# Patient Record
Sex: FEMALE | Race: WHITE | Employment: UNEMPLOYED | URBAN - METROPOLITAN AREA
[De-identification: names, ages, dates, MRNs, and addresses within clinical notes are randomized per-mention and may not be internally consistent; named-entity substitution may affect disease eponyms.]

---

## 2022-06-23 ENCOUNTER — ANESTHESIA EVENT (OUTPATIENT)
Dept: OPERATING ROOM | Age: 72
DRG: 481 | End: 2022-06-23
Payer: MEDICARE

## 2022-06-23 ENCOUNTER — ANESTHESIA (OUTPATIENT)
Dept: OPERATING ROOM | Age: 72
DRG: 481 | End: 2022-06-23
Payer: MEDICARE

## 2022-06-23 ENCOUNTER — APPOINTMENT (OUTPATIENT)
Dept: GENERAL RADIOLOGY | Age: 72
DRG: 481 | End: 2022-06-23
Payer: MEDICARE

## 2022-06-23 ENCOUNTER — HOSPITAL ENCOUNTER (INPATIENT)
Age: 72
LOS: 2 days | Discharge: HOME | DRG: 481 | End: 2022-06-25
Attending: EMERGENCY MEDICINE | Admitting: ORTHOPAEDIC SURGERY
Payer: MEDICARE

## 2022-06-23 DIAGNOSIS — S72.002A CLOSED LEFT HIP FRACTURE, INITIAL ENCOUNTER (HCC): Primary | ICD-10-CM

## 2022-06-23 LAB
ABSOLUTE EOS #: 0 K/UL (ref 0–0.4)
ABSOLUTE LYMPH #: 0.5 K/UL (ref 1–4.8)
ABSOLUTE MONO #: 0.3 K/UL (ref 0.1–1.3)
ALBUMIN SERPL-MCNC: 4.3 G/DL (ref 3.5–5.2)
ALP BLD-CCNC: 95 U/L (ref 35–104)
ALT SERPL-CCNC: 10 U/L (ref 5–33)
ANION GAP SERPL CALCULATED.3IONS-SCNC: 11 MMOL/L (ref 9–17)
AST SERPL-CCNC: 16 U/L
BASOPHILS # BLD: 1 % (ref 0–2)
BASOPHILS ABSOLUTE: 0 K/UL (ref 0–0.2)
BILIRUB SERPL-MCNC: 0.8 MG/DL (ref 0.3–1.2)
BUN BLDV-MCNC: 13 MG/DL (ref 8–23)
CALCIUM SERPL-MCNC: 8.9 MG/DL (ref 8.6–10.4)
CHLORIDE BLD-SCNC: 103 MMOL/L (ref 98–107)
CO2: 26 MMOL/L (ref 20–31)
CREAT SERPL-MCNC: 0.46 MG/DL (ref 0.5–0.9)
EOSINOPHILS RELATIVE PERCENT: 1 % (ref 0–4)
GFR AFRICAN AMERICAN: >60 ML/MIN
GFR NON-AFRICAN AMERICAN: >60 ML/MIN
GFR SERPL CREATININE-BSD FRML MDRD: ABNORMAL ML/MIN/{1.73_M2}
GLUCOSE BLD-MCNC: 105 MG/DL (ref 70–99)
HCT VFR BLD CALC: 42 % (ref 36–46)
HEMOGLOBIN: 13.7 G/DL (ref 12–16)
INR BLD: 1
LYMPHOCYTES # BLD: 7 % (ref 24–44)
MCH RBC QN AUTO: 30.2 PG (ref 26–34)
MCHC RBC AUTO-ENTMCNC: 32.7 G/DL (ref 31–37)
MCV RBC AUTO: 92.4 FL (ref 80–100)
MONOCYTES # BLD: 4 % (ref 1–7)
PDW BLD-RTO: 15.9 % (ref 11.5–14.9)
PLATELET # BLD: 150 K/UL (ref 150–450)
PMV BLD AUTO: 9 FL (ref 6–12)
POTASSIUM SERPL-SCNC: 3.6 MMOL/L (ref 3.7–5.3)
PROTHROMBIN TIME: 12.8 SEC (ref 11.8–14.6)
RBC # BLD: 4.54 M/UL (ref 4–5.2)
SEG NEUTROPHILS: 87 % (ref 36–66)
SEGMENTED NEUTROPHILS ABSOLUTE COUNT: 5.9 K/UL (ref 1.3–9.1)
SODIUM BLD-SCNC: 140 MMOL/L (ref 135–144)
TOTAL PROTEIN: 6.8 G/DL (ref 6.4–8.3)
WBC # BLD: 6.8 K/UL (ref 3.5–11)

## 2022-06-23 PROCEDURE — 6360000002 HC RX W HCPCS: Performed by: EMERGENCY MEDICINE

## 2022-06-23 PROCEDURE — 0QS706Z REPOSITION LEFT UPPER FEMUR WITH INTRAMEDULLARY INTERNAL FIXATION DEVICE, OPEN APPROACH: ICD-10-PCS | Performed by: ORTHOPAEDIC SURGERY

## 2022-06-23 PROCEDURE — 3700000000 HC ANESTHESIA ATTENDED CARE: Performed by: ORTHOPAEDIC SURGERY

## 2022-06-23 PROCEDURE — 2500000003 HC RX 250 WO HCPCS: Performed by: ANESTHESIOLOGY

## 2022-06-23 PROCEDURE — C1769 GUIDE WIRE: HCPCS | Performed by: ORTHOPAEDIC SURGERY

## 2022-06-23 PROCEDURE — 6360000002 HC RX W HCPCS: Performed by: ANESTHESIOLOGY

## 2022-06-23 PROCEDURE — 6360000002 HC RX W HCPCS: Performed by: ORTHOPAEDIC SURGERY

## 2022-06-23 PROCEDURE — 36415 COLL VENOUS BLD VENIPUNCTURE: CPT

## 2022-06-23 PROCEDURE — 2580000003 HC RX 258: Performed by: ANESTHESIOLOGY

## 2022-06-23 PROCEDURE — 80053 COMPREHEN METABOLIC PANEL: CPT

## 2022-06-23 PROCEDURE — 3600000004 HC SURGERY LEVEL 4 BASE: Performed by: ORTHOPAEDIC SURGERY

## 2022-06-23 PROCEDURE — 3700000001 HC ADD 15 MINUTES (ANESTHESIA): Performed by: ORTHOPAEDIC SURGERY

## 2022-06-23 PROCEDURE — 3600000014 HC SURGERY LEVEL 4 ADDTL 15MIN: Performed by: ORTHOPAEDIC SURGERY

## 2022-06-23 PROCEDURE — 6360000002 HC RX W HCPCS: Performed by: INTERNAL MEDICINE

## 2022-06-23 PROCEDURE — 7100000000 HC PACU RECOVERY - FIRST 15 MIN: Performed by: ORTHOPAEDIC SURGERY

## 2022-06-23 PROCEDURE — 3209999900 FLUORO FOR SURGICAL PROCEDURES

## 2022-06-23 PROCEDURE — 7100000001 HC PACU RECOVERY - ADDTL 15 MIN: Performed by: ORTHOPAEDIC SURGERY

## 2022-06-23 PROCEDURE — 99285 EMERGENCY DEPT VISIT HI MDM: CPT

## 2022-06-23 PROCEDURE — 2720000010 HC SURG SUPPLY STERILE: Performed by: ORTHOPAEDIC SURGERY

## 2022-06-23 PROCEDURE — 1200000000 HC SEMI PRIVATE

## 2022-06-23 PROCEDURE — 96374 THER/PROPH/DIAG INJ IV PUSH: CPT

## 2022-06-23 PROCEDURE — 2580000003 HC RX 258: Performed by: ORTHOPAEDIC SURGERY

## 2022-06-23 PROCEDURE — 93005 ELECTROCARDIOGRAM TRACING: CPT | Performed by: EMERGENCY MEDICINE

## 2022-06-23 PROCEDURE — 85025 COMPLETE CBC W/AUTO DIFF WBC: CPT

## 2022-06-23 PROCEDURE — 85610 PROTHROMBIN TIME: CPT

## 2022-06-23 PROCEDURE — 99222 1ST HOSP IP/OBS MODERATE 55: CPT | Performed by: INTERNAL MEDICINE

## 2022-06-23 PROCEDURE — 73502 X-RAY EXAM HIP UNI 2-3 VIEWS: CPT

## 2022-06-23 PROCEDURE — C1713 ANCHOR/SCREW BN/BN,TIS/BN: HCPCS | Performed by: ORTHOPAEDIC SURGERY

## 2022-06-23 PROCEDURE — 2709999900 HC NON-CHARGEABLE SUPPLY: Performed by: ORTHOPAEDIC SURGERY

## 2022-06-23 DEVICE — IMPLANTABLE DEVICE: Type: IMPLANTABLE DEVICE | Site: HIP | Status: FUNCTIONAL

## 2022-06-23 DEVICE — SCREW BNE L38MM DIA5MM NONSTERILE TIB LT GRN TI ST CANN LOK: Type: IMPLANTABLE DEVICE | Site: HIP | Status: FUNCTIONAL

## 2022-06-23 RX ORDER — ACETAMINOPHEN 325 MG/1
650 TABLET ORAL EVERY 4 HOURS PRN
Status: DISCONTINUED | OUTPATIENT
Start: 2022-06-23 | End: 2022-06-25 | Stop reason: HOSPADM

## 2022-06-23 RX ORDER — SODIUM CHLORIDE 0.9 % (FLUSH) 0.9 %
5-40 SYRINGE (ML) INJECTION EVERY 12 HOURS SCHEDULED
Status: DISCONTINUED | OUTPATIENT
Start: 2022-06-23 | End: 2022-06-23 | Stop reason: HOSPADM

## 2022-06-23 RX ORDER — ONDANSETRON 2 MG/ML
4 INJECTION INTRAMUSCULAR; INTRAVENOUS
Status: DISCONTINUED | OUTPATIENT
Start: 2022-06-23 | End: 2022-06-23 | Stop reason: HOSPADM

## 2022-06-23 RX ORDER — SODIUM CHLORIDE, SODIUM LACTATE, POTASSIUM CHLORIDE, CALCIUM CHLORIDE 600; 310; 30; 20 MG/100ML; MG/100ML; MG/100ML; MG/100ML
INJECTION, SOLUTION INTRAVENOUS CONTINUOUS PRN
Status: DISCONTINUED | OUTPATIENT
Start: 2022-06-23 | End: 2022-06-23 | Stop reason: SDUPTHER

## 2022-06-23 RX ORDER — ENOXAPARIN SODIUM 100 MG/ML
30 INJECTION SUBCUTANEOUS DAILY
Status: DISCONTINUED | OUTPATIENT
Start: 2022-06-24 | End: 2022-06-25 | Stop reason: HOSPADM

## 2022-06-23 RX ORDER — HYDROCODONE BITARTRATE AND ACETAMINOPHEN 5; 325 MG/1; MG/1
2 TABLET ORAL EVERY 4 HOURS PRN
Status: DISCONTINUED | OUTPATIENT
Start: 2022-06-23 | End: 2022-06-25 | Stop reason: HOSPADM

## 2022-06-23 RX ORDER — POTASSIUM CHLORIDE 20 MEQ/1
40 TABLET, EXTENDED RELEASE ORAL PRN
Status: DISCONTINUED | OUTPATIENT
Start: 2022-06-23 | End: 2022-06-25 | Stop reason: HOSPADM

## 2022-06-23 RX ORDER — SODIUM CHLORIDE 9 MG/ML
INJECTION, SOLUTION INTRAVENOUS PRN
Status: DISCONTINUED | OUTPATIENT
Start: 2022-06-23 | End: 2022-06-25 | Stop reason: HOSPADM

## 2022-06-23 RX ORDER — ONDANSETRON 4 MG/1
4 TABLET, ORALLY DISINTEGRATING ORAL EVERY 8 HOURS PRN
Status: DISCONTINUED | OUTPATIENT
Start: 2022-06-23 | End: 2022-06-25 | Stop reason: HOSPADM

## 2022-06-23 RX ORDER — SODIUM CHLORIDE 0.9 % (FLUSH) 0.9 %
5-40 SYRINGE (ML) INJECTION PRN
Status: DISCONTINUED | OUTPATIENT
Start: 2022-06-23 | End: 2022-06-25 | Stop reason: HOSPADM

## 2022-06-23 RX ORDER — HYDRALAZINE HYDROCHLORIDE 20 MG/ML
10 INJECTION INTRAMUSCULAR; INTRAVENOUS
Status: DISCONTINUED | OUTPATIENT
Start: 2022-06-23 | End: 2022-06-23 | Stop reason: HOSPADM

## 2022-06-23 RX ORDER — FENTANYL CITRATE 50 UG/ML
INJECTION, SOLUTION INTRAMUSCULAR; INTRAVENOUS
Status: DISPENSED
Start: 2022-06-23 | End: 2022-06-24

## 2022-06-23 RX ORDER — METOCLOPRAMIDE HYDROCHLORIDE 5 MG/ML
10 INJECTION INTRAMUSCULAR; INTRAVENOUS
Status: DISCONTINUED | OUTPATIENT
Start: 2022-06-23 | End: 2022-06-23 | Stop reason: HOSPADM

## 2022-06-23 RX ORDER — SODIUM CHLORIDE 0.9 % (FLUSH) 0.9 %
5-40 SYRINGE (ML) INJECTION EVERY 12 HOURS SCHEDULED
Status: DISCONTINUED | OUTPATIENT
Start: 2022-06-23 | End: 2022-06-25 | Stop reason: HOSPADM

## 2022-06-23 RX ORDER — FENTANYL CITRATE 50 UG/ML
25 INJECTION, SOLUTION INTRAMUSCULAR; INTRAVENOUS EVERY 5 MIN PRN
Status: DISCONTINUED | OUTPATIENT
Start: 2022-06-23 | End: 2022-06-23 | Stop reason: HOSPADM

## 2022-06-23 RX ORDER — HYDRALAZINE HYDROCHLORIDE 20 MG/ML
10 INJECTION INTRAMUSCULAR; INTRAVENOUS EVERY 6 HOURS PRN
Status: DISCONTINUED | OUTPATIENT
Start: 2022-06-23 | End: 2022-06-25 | Stop reason: HOSPADM

## 2022-06-23 RX ORDER — MORPHINE SULFATE 2 MG/ML
2 INJECTION, SOLUTION INTRAMUSCULAR; INTRAVENOUS
Status: DISCONTINUED | OUTPATIENT
Start: 2022-06-23 | End: 2022-06-23

## 2022-06-23 RX ORDER — PROPOFOL 10 MG/ML
INJECTION, EMULSION INTRAVENOUS PRN
Status: DISCONTINUED | OUTPATIENT
Start: 2022-06-23 | End: 2022-06-23 | Stop reason: SDUPTHER

## 2022-06-23 RX ORDER — MORPHINE SULFATE 4 MG/ML
4 INJECTION, SOLUTION INTRAMUSCULAR; INTRAVENOUS
Status: DISCONTINUED | OUTPATIENT
Start: 2022-06-23 | End: 2022-06-23

## 2022-06-23 RX ORDER — SODIUM CHLORIDE 0.9 % (FLUSH) 0.9 %
5-40 SYRINGE (ML) INJECTION PRN
Status: DISCONTINUED | OUTPATIENT
Start: 2022-06-23 | End: 2022-06-23 | Stop reason: HOSPADM

## 2022-06-23 RX ORDER — DEXAMETHASONE SODIUM PHOSPHATE 4 MG/ML
INJECTION, SOLUTION INTRA-ARTICULAR; INTRALESIONAL; INTRAMUSCULAR; INTRAVENOUS; SOFT TISSUE PRN
Status: DISCONTINUED | OUTPATIENT
Start: 2022-06-23 | End: 2022-06-23 | Stop reason: SDUPTHER

## 2022-06-23 RX ORDER — ROCURONIUM BROMIDE 10 MG/ML
INJECTION, SOLUTION INTRAVENOUS PRN
Status: DISCONTINUED | OUTPATIENT
Start: 2022-06-23 | End: 2022-06-23 | Stop reason: SDUPTHER

## 2022-06-23 RX ORDER — ENOXAPARIN SODIUM 100 MG/ML
30 INJECTION SUBCUTANEOUS DAILY
Status: DISCONTINUED | OUTPATIENT
Start: 2022-06-23 | End: 2022-06-23

## 2022-06-23 RX ORDER — CEFAZOLIN SODIUM 1 G/3ML
INJECTION, POWDER, FOR SOLUTION INTRAMUSCULAR; INTRAVENOUS PRN
Status: DISCONTINUED | OUTPATIENT
Start: 2022-06-23 | End: 2022-06-23 | Stop reason: SDUPTHER

## 2022-06-23 RX ORDER — HYDROCODONE BITARTRATE AND ACETAMINOPHEN 5; 325 MG/1; MG/1
1 TABLET ORAL EVERY 4 HOURS PRN
Status: DISCONTINUED | OUTPATIENT
Start: 2022-06-23 | End: 2022-06-25 | Stop reason: HOSPADM

## 2022-06-23 RX ORDER — FENTANYL CITRATE 50 UG/ML
INJECTION, SOLUTION INTRAMUSCULAR; INTRAVENOUS PRN
Status: DISCONTINUED | OUTPATIENT
Start: 2022-06-23 | End: 2022-06-23 | Stop reason: SDUPTHER

## 2022-06-23 RX ORDER — ONDANSETRON 2 MG/ML
4 INJECTION INTRAMUSCULAR; INTRAVENOUS EVERY 6 HOURS PRN
Status: DISCONTINUED | OUTPATIENT
Start: 2022-06-23 | End: 2022-06-25 | Stop reason: HOSPADM

## 2022-06-23 RX ORDER — ONDANSETRON 2 MG/ML
INJECTION INTRAMUSCULAR; INTRAVENOUS PRN
Status: DISCONTINUED | OUTPATIENT
Start: 2022-06-23 | End: 2022-06-23 | Stop reason: SDUPTHER

## 2022-06-23 RX ORDER — DEXTROSE AND SODIUM CHLORIDE 5; .45 G/100ML; G/100ML
INJECTION, SOLUTION INTRAVENOUS CONTINUOUS
Status: DISCONTINUED | OUTPATIENT
Start: 2022-06-23 | End: 2022-06-24

## 2022-06-23 RX ORDER — SODIUM CHLORIDE 9 MG/ML
25 INJECTION, SOLUTION INTRAVENOUS PRN
Status: DISCONTINUED | OUTPATIENT
Start: 2022-06-23 | End: 2022-06-23 | Stop reason: HOSPADM

## 2022-06-23 RX ORDER — LABETALOL HYDROCHLORIDE 5 MG/ML
10 INJECTION, SOLUTION INTRAVENOUS
Status: DISCONTINUED | OUTPATIENT
Start: 2022-06-23 | End: 2022-06-23 | Stop reason: HOSPADM

## 2022-06-23 RX ORDER — DIPHENHYDRAMINE HYDROCHLORIDE 50 MG/ML
12.5 INJECTION INTRAMUSCULAR; INTRAVENOUS
Status: DISCONTINUED | OUTPATIENT
Start: 2022-06-23 | End: 2022-06-23 | Stop reason: HOSPADM

## 2022-06-23 RX ORDER — ASPIRIN 81 MG/1
81 TABLET ORAL DAILY
Status: ON HOLD | COMMUNITY
End: 2022-06-25 | Stop reason: HOSPADM

## 2022-06-23 RX ORDER — POTASSIUM CHLORIDE 7.45 MG/ML
10 INJECTION INTRAVENOUS PRN
Status: DISCONTINUED | OUTPATIENT
Start: 2022-06-23 | End: 2022-06-25 | Stop reason: HOSPADM

## 2022-06-23 RX ADMIN — ROCURONIUM BROMIDE 40 MG: 50 INJECTION, SOLUTION INTRAVENOUS at 18:51

## 2022-06-23 RX ADMIN — FENTANYL CITRATE 25 MCG: 50 INJECTION, SOLUTION INTRAMUSCULAR; INTRAVENOUS at 20:21

## 2022-06-23 RX ADMIN — DEXAMETHASONE SODIUM PHOSPHATE 8 MG: 4 INJECTION, SOLUTION INTRAMUSCULAR; INTRAVENOUS at 19:10

## 2022-06-23 RX ADMIN — FENTANYL CITRATE 25 MCG: 50 INJECTION, SOLUTION INTRAMUSCULAR; INTRAVENOUS at 20:10

## 2022-06-23 RX ADMIN — HYDRALAZINE HYDROCHLORIDE 10 MG: 20 INJECTION INTRAMUSCULAR; INTRAVENOUS at 17:01

## 2022-06-23 RX ADMIN — PHENYLEPHRINE HYDROCHLORIDE 100 MCG: 10 INJECTION INTRAVENOUS at 19:11

## 2022-06-23 RX ADMIN — FENTANYL CITRATE 50 MCG: 50 INJECTION, SOLUTION INTRAMUSCULAR; INTRAVENOUS at 18:51

## 2022-06-23 RX ADMIN — DEXTROSE AND SODIUM CHLORIDE: 5; 450 INJECTION, SOLUTION INTRAVENOUS at 14:16

## 2022-06-23 RX ADMIN — PHENYLEPHRINE HYDROCHLORIDE 200 MCG: 10 INJECTION INTRAVENOUS at 19:15

## 2022-06-23 RX ADMIN — HYDROMORPHONE HYDROCHLORIDE 0.5 MG: 1 INJECTION, SOLUTION INTRAMUSCULAR; INTRAVENOUS; SUBCUTANEOUS at 10:38

## 2022-06-23 RX ADMIN — MORPHINE SULFATE 2 MG: 2 INJECTION, SOLUTION INTRAMUSCULAR; INTRAVENOUS at 15:52

## 2022-06-23 RX ADMIN — CEFAZOLIN 2000 MG: 1 INJECTION, POWDER, FOR SOLUTION INTRAMUSCULAR; INTRAVENOUS at 19:01

## 2022-06-23 RX ADMIN — PHENYLEPHRINE HYDROCHLORIDE 200 MCG: 10 INJECTION INTRAVENOUS at 19:32

## 2022-06-23 RX ADMIN — ONDANSETRON 4 MG: 2 INJECTION INTRAMUSCULAR; INTRAVENOUS at 19:19

## 2022-06-23 RX ADMIN — SODIUM CHLORIDE, POTASSIUM CHLORIDE, SODIUM LACTATE AND CALCIUM CHLORIDE: 600; 310; 30; 20 INJECTION, SOLUTION INTRAVENOUS at 19:19

## 2022-06-23 RX ADMIN — SUGAMMADEX 100 MG: 100 INJECTION, SOLUTION INTRAVENOUS at 19:37

## 2022-06-23 RX ADMIN — LIDOCAINE HYDROCHLORIDE 3 ML: 20 INJECTION, SOLUTION EPIDURAL; INFILTRATION; INTRACAUDAL; PERINEURAL at 18:51

## 2022-06-23 RX ADMIN — PHENYLEPHRINE HYDROCHLORIDE 100 MCG: 10 INJECTION INTRAVENOUS at 19:21

## 2022-06-23 RX ADMIN — DEXTROSE AND SODIUM CHLORIDE: 5; 450 INJECTION, SOLUTION INTRAVENOUS at 21:28

## 2022-06-23 RX ADMIN — PHENYLEPHRINE HYDROCHLORIDE 200 MCG: 10 INJECTION INTRAVENOUS at 19:25

## 2022-06-23 RX ADMIN — PHENYLEPHRINE HYDROCHLORIDE 200 MCG: 10 INJECTION INTRAVENOUS at 18:57

## 2022-06-23 RX ADMIN — PROPOFOL 80 MG: 10 INJECTION, EMULSION INTRAVENOUS at 18:51

## 2022-06-23 ASSESSMENT — PAIN SCALES - GENERAL
PAINLEVEL_OUTOF10: 10

## 2022-06-23 ASSESSMENT — PAIN DESCRIPTION - LOCATION
LOCATION: GROIN
LOCATION: HIP
LOCATION: GROIN
LOCATION: HIP

## 2022-06-23 ASSESSMENT — PAIN DESCRIPTION - DESCRIPTORS
DESCRIPTORS: OTHER (COMMENT)
DESCRIPTORS: SHARP
DESCRIPTORS: ACHING
DESCRIPTORS: ACHING
DESCRIPTORS: SHARP

## 2022-06-23 ASSESSMENT — PAIN DESCRIPTION - ORIENTATION
ORIENTATION: LEFT

## 2022-06-23 ASSESSMENT — ENCOUNTER SYMPTOMS: SHORTNESS OF BREATH: 0

## 2022-06-23 ASSESSMENT — LIFESTYLE VARIABLES: SMOKING_STATUS: 1

## 2022-06-23 ASSESSMENT — PAIN - FUNCTIONAL ASSESSMENT: PAIN_FUNCTIONAL_ASSESSMENT: 0-10

## 2022-06-23 ASSESSMENT — PAIN DESCRIPTION - PAIN TYPE
TYPE: SURGICAL PAIN
TYPE: ACUTE PAIN

## 2022-06-23 NOTE — ED NOTES
Mode of arrival (squad #, walk in, police, etc) : Medic 9 EMS         Chief complaint(s): fall, hip pain        Arrival Note (brief scenario, treatment PTA, etc). : patient states she was walking with her cart where the concrete wasn't even. Patient states the cart got caught and she fell injuring her left hip. Patient's left leg is externally rotated and shortened. Patient states her pain is located in her left hip and left groin. Patient is alert and oriented x4. Patient denies any head injury or LOC. C= \"Have you ever felt that you should Cut down on your drinking? \"  No  A= \"Have people Annoyed you by criticizing your drinking? \"  No  G= \"Have you ever felt bad or Guilty about your drinking? \"  No  E= \"Have you ever had a drink as an Eye-opener first thing in the morning to steady your nerves or to help a hangover? \"  No      Deferred []      Reason for deferring: N/A    *If yes to two or more: probable alcohol abuse. Tereso Buchanan RN  06/23/22 1024

## 2022-06-23 NOTE — BRIEF OP NOTE
Brief Postoperative Note      Patient: Taran Bonilla  YOB: 1950  MRN: [id number]    Date of Procedure: 6/23/2022    Pre-Op Diagnosis: LEFT HIP FRACTURE left IT  IP  2058    Post-Op Diagnosis: Same       Procedure(s):  HIP TFN    Surgeon(s):  Aditya Suero MD    Assistant:  * No surgical staff found *    Anesthesia: General    Estimated Blood Loss (mL): less than 50     Complications: None    Specimens:   * No specimens in log *    Implants:  Implant Name Type Inv.  Item Serial No.  Lot No. LRB No. Used Action   NAIL IM [id number] [id number] 130DEG SHT PROX FEM GRN TI [employer] SONNY - [id number]  NAIL IM [id number] [id number] 130DEG SHT PROX FEM GRN TI [employer] SONNY  [employer]WD [id number] Left 1 Implanted   SCREW BNE L38MM DIA5MM NONSTERILE TIB LT GRN TI [facility] [employer] [facility] - [id number]  SCREW BNE L38MM DIA5MM NONSTERILE TIB LT GRN TI [facility] [employer] [facility]  [employer]WD  Left 1 Implanted         Drains: * No LDAs found *    Findings: see dictation    Electronically signed by Aditya Suero MD on 6/23/2022 at 7:53 PM

## 2022-06-23 NOTE — ED NOTES
Spoke with patient's daughter Simone Weaver. Simone Weaver currently lives in [facility] and is concerned about her mom's wellbeing and what will happen following surgery. This RN informed Simone Weaver that patient is stable and in good hands. Simone Weaver recommended her older sister Emil bennett be contacted with any new information about plan of care. Yessy's phone number [phone].       Eleuterio Richard RN  06/23/22 6826

## 2022-06-23 NOTE — ANESTHESIA PRE PROCEDURE
Department of Anesthesiology  Preprocedure Note       Name:  Braxton Abrams   Age:  74 y.o.  :  1950                                          MRN:  [id number]         Date:  2022      Surgeon: Josh Coy):  Mario Lai MD    Procedure: Procedure(s):  HIP TFN    Medications prior to admission:   Prior to Admission medications    Medication Sig Start Date End Date Taking?  Authorizing Provider   Aspirin-Acetaminophen-Caffeine (EXCEDRIN PO) Take by mouth as needed   Yes Historical Provider, MD   aspirin 81 MG EC tablet Take 81 mg by mouth daily   Yes Historical Provider, MD       Current medications:    Current Facility-Administered Medications   Medication Dose Route Frequency Provider Last Rate Last Admin    sodium chloride flush 0.9 % injection 5-40 mL  5-40 mL IntraVENous 2 times per day Mario Lai MD        sodium chloride flush 0.9 % injection 5-40 mL  5-40 mL IntraVENous PRN Mario Lai MD        0.9 % sodium chloride infusion   IntraVENous PRN Mario Lai MD       [address] [place] [address] [Held by provider] enoxaparin Sodium (LOVENOX) injection 30 mg  30 mg SubCUTAneous Daily Mario Lai MD        acetaminophen (TYLENOL) tablet 650 mg  650 mg Oral Q4H PRN Mario Lai MD        ondansetron (ZOFRAN-ODT) disintegrating tablet 4 mg  4 mg Oral Q8H PRN Mario Lai MD        Or    ondansetron [facility]) injection 4 mg  4 mg IntraVENous Q6H PRN Mario Lai MD        dextrose 5 % and 0.45 % sodium chloride infusion   IntraVENous Continuous Mario Lai MD 75 mL/hr at 22 1416 New Bag at 22 1416    potassium chloride (KLOR-CON M) extended release tablet 40 mEq  40 mEq Oral PRN Azar Quiles MD        Or    potassium bicarb-citric acid (EFFER-K) effervescent tablet 40 mEq  40 mEq Oral PRN zAar Quiles MD        Or    potassium chloride 10 mEq/100 mL IVPB (Peripheral Line)  10 mEq IntraVENous PRN Azar Quiles MD        morphine (PF) injection 2 mg  2 mg IntraVENous Q2H PRN Mario Lai MD   2 mg at 06/23/22 1552    Or    morphine sulfate (PF) injection 4 mg  4 mg IntraVENous Q2H PRN Preethi Walter MD           Allergies: Allergies   Allergen Reactions    Pcn [Penicillins] Swelling     When she was little, had swelling requiring epinephrine. Problem List:    Patient Active Problem List   Diagnosis Code    [facility] S72.002A       Past Medical History:        Diagnosis Date    Hypertension        Past Surgical History:  History reviewed. No pertinent surgical history. Social History:    Social History     Tobacco Use    Smoking status: Current Every Day Smoker     Types: Cigarettes    Smokeless tobacco: Not on file   Substance Use Topics    Alcohol use: Never                                Ready to quit: Not Answered  Counseling given: No      Vital Signs (Current):   Vitals:    06/23/22 1226 06/23/22 1300 06/23/22 1311 06/23/22 1329   BP:  (!) 159/94 (!) 159/94 (!) 179/93   Pulse: 76 81 75 71   Resp: 23 24 24 18   Temp:   97.9 °F (36.6 °C) 97.5 °F (36.4 °C)   TempSrc:   Oral    SpO2:  93% 95% 95%   Weight:       Height:                                                  BP Readings from Last 3 Encounters:   06/23/22 (!) 179/93       NPO Status:                                                                                 BMI:   Wt Readings from Last 3 Encounters:   06/23/22 98 lb (44.5 kg)     Body mass index is 16.31 kg/m².     CBC:   Lab Results   Component Value Date    WBC 6.8 06/23/2022    RBC 4.54 06/23/2022    HGB 13.7 06/23/2022    HCT 42.0 06/23/2022    MCV 92.4 06/23/2022    RDW 15.9 06/23/2022     06/23/2022       CMP:   Lab Results   Component Value Date     06/23/2022    K 3.6 06/23/2022     06/23/2022    CO2 26 06/23/2022    BUN 13 06/23/2022    CREATININE 0.46 06/23/2022    GFRAA >60 06/23/2022    LABGLOM >60 06/23/2022    GLUCOSE 105 06/23/2022    PROT 6.8 06/23/2022    CALCIUM 8.9 06/23/2022    BILITOT 0.80 06/23/2022    ALKPHOS 95 06/23/2022    AST 16 06/23/2022    ALT 10 06/23/2022       POC Tests: No results for input(s): POCGLU, POCNA, POCK, POCCL, POCBUN, POCHEMO, POCHCT in the last 72 hours. Coags:   Lab Results   Component Value Date    PROTIME 12.8 06/23/2022    INR 1.0 06/23/2022       HCG (If Applicable): No results found for: PREGTESTUR, PREGSERUM, HCG, HCGQUANT     ABGs: No results found for: PHART, PO2ART, BVN1MMY, GSK0NBX, BEART, G3LTPFXK     Type & Screen (If Applicable):  No results found for: LABABO, LABRH    Drug/Infectious Status (If Applicable):  No results found for: HIV, HEPCAB    COVID-19 Screening (If Applicable): No results found for: COVID19        Anesthesia Evaluation  Patient summary reviewed and Nursing notes reviewed no history of anesthetic complications:   Airway: Mallampati: II  TM distance: >3 FB   Neck ROM: full  Mouth opening: > = 3 FB   Dental:    (+) poor dentition      Pulmonary:normal exam  breath sounds clear to auscultation  (+) current smoker    (-) shortness of breath                           Cardiovascular:  Exercise tolerance: good (>4 METS),   (+) hypertension:,     (-)  angina    ECG reviewed  Rhythm: irregular  Rate: normal                    Neuro/Psych:   Negative Neuro/Psych ROS              GI/Hepatic/Renal:        (-) GERD      ROS comment: Malnutrition BMI 16.   Endo/Other:                      ROS comment: LEFT HIP FRACTURE  Abdominal:             Vascular: negative vascular ROS. Other Findings:           Anesthesia Plan      general     ASA 3 - emergent       Induction: intravenous. MIPS: Postoperative opioids intended and Prophylactic antiemetics administered. Anesthetic plan and risks discussed with patient. Plan discussed with CRNA.                     eSlena Lora MD   6/23/2022

## 2022-06-23 NOTE — H&P
History and Physical        CHIEF COMPLAINT:  Left hip pain    HISTORY OF PRESENT ILLNESS:      The patient is a 74 y.o. female  who presents with ground level fall when grocery cart went wrong way denies pain other than hip fracture. No doctor for > 10 years    Past Medical History:    Past Medical History:   Diagnosis Date    Hypertension        Past Surgical History:    History reviewed. No pertinent surgical history. Medications Prior to Admission:   Prior to Admission medications    Medication Sig Start Date End Date Taking? Authorizing Provider   Aspirin-Acetaminophen-Caffeine (EXCEDRIN PO) Take by mouth as needed   Yes Historical Provider, MD   aspirin 81 MG EC tablet Take 81 mg by mouth daily   Yes Historical Provider, MD    Scheduled Meds:   [MAR Hold] sodium chloride flush  5-40 mL IntraVENous 2 times per day    [Held by provider] enoxaparin  30 mg SubCUTAneous Daily     Continuous Infusions:   [MAR Hold] sodium chloride      [MAR Hold] dextrose 5 % and 0.45 % NaCl Stopped (06/23/22 1919)     PRN Meds:. [MAR Hold] sodium chloride flush, [MAR Hold] sodium chloride, [MAR Hold] acetaminophen, [MAR Hold] ondansetron **OR** [MAR Hold] ondansetron, [MAR Hold] potassium chloride **OR** [MAR Hold] potassium alternative oral replacement **OR** [MAR Hold] potassium chloride, [MAR Hold] morphine **OR** [MAR Hold] morphine, [MAR Hold] hydrALAZINE      Allergies:  Pcn [penicillins]    Social History:   Social History     Occupational History    Not on file   Tobacco Use    Smoking status: Current Every Day Smoker     Types: Cigarettes    Smokeless tobacco: Not on file   Substance and Sexual Activity    Alcohol use: Never    Drug use: Never    Sexual activity: Never        Family History:  History reviewed. No pertinent family history.       REVIEW OF SYSTEMS:  Gen: Negative for nausea, vomiting, diarrhea, fever, chills, night sweats  Heart: Negative for HTN, palpitations, chest pain  Lungs: Negative for wheezes, asthma or SOB  GI: Negative for nausea, vomiting  Endo: Negative for diabetes  Heme: Negative for DVT       PHYSICAL EXAM:  Patient Vitals for the past 24 hrs:   BP Temp Temp src Pulse Resp SpO2 Height Weight   06/23/22 1700 (!) 184/98 -- -- -- -- -- -- --   06/23/22 1329 (!) 179/93 97.5 °F (36.4 °C) -- 71 18 95 % -- --   06/23/22 1311 (!) 159/94 97.9 °F (36.6 °C) Oral 75 24 95 % -- --   06/23/22 1300 (!) 159/94 -- -- 81 24 93 % -- --   06/23/22 1226 -- -- -- 76 23 -- -- --   06/23/22 1008 (!) 172/112 97.5 °F (36.4 °C) Oral 86 20 96 % 5' 5\" (1.651 m) 98 lb (44.5 kg)     Gen: alert and oriented  Head: normorcephalic, atraumatic  Neck: supple  Heart: RRR  Lungs: No audible wheezes  Abdomen: soft  Pelvis: stable  Extremity left hip short ER    DATA:  CBC:   Lab Results   Component Value Date    WBC 6.8 06/23/2022    HGB 13.7 06/23/2022     06/23/2022     BMP:    Lab Results   Component Value Date     06/23/2022    K 3.6 06/23/2022     06/23/2022    CO2 26 06/23/2022    BUN 13 06/23/2022    CREATININE 0.46 06/23/2022    CALCIUM 8.9 06/23/2022    GLUCOSE 105 06/23/2022     PT/INR:    Lab Results   Component Value Date    PROTIME 12.8 06/23/2022    INR 1.0 06/23/2022     Troponin:  No results found for: [address]    Radiology: left IT hip fracture    ASSESSMENT: Principal Problem:    Closed left hip fracture, initial encounter ([facility]  Resolved Problems:    * No resolved hospital problems.  *       PLAN:  1)  Surgical treatment  Medical consult        Arabella Castro MD

## 2022-06-23 NOTE — PROGRESS NOTES
Medication History completed:    New medications:  Aspirin 81mg daily    Medications discontinued: None    Changes to dosing: None    Stated allergies:  PCN (swelling which required epinephrine per patient, only happened one time when patient previously had PCN many times as a child)    Other pertinent information:  Medication history confirmed with patient, she states she only takes baby aspirin daily and Excedrin PRN.   Patient states she is not a chain smoker, does smoke nearly 1 pack of cigarettes per day  Patient denies cannabis or street drug use    Thank you,  Evelia Pryor PharmD, Liliane Bagley [id number]  PGY-1 Pharmacy Resident

## 2022-06-23 NOTE — ED NOTES
Report given to July Stringer RN from Med Surg. Report method by phone   The following was reviewed with receiving RN:   Current vital signs:  BP (!) 159/94   Pulse 81   Temp 97.5 °F (36.4 °C) (Oral)   Resp 24   Ht 5' 5\" (1.651 m)   Wt 98 lb (44.5 kg)   SpO2 93%   BMI 16.31 kg/m²                MEWS Score: 1     Any medication or safety alerts were reviewed. Any pending diagnostics and notifications were also reviewed, as well as any safety concerns or issues, abnormal labs, abnormal imaging, and abnormal assessment findings. Questions were answered.            Marj Kendall RN  06/23/22 2029

## 2022-06-23 NOTE — PROGRESS NOTES
Patient ready for surgery. /98. Dr Karyn Laurener notified and order received to give Hydralazine 10mg .

## 2022-06-23 NOTE — CONSULTS
[place] [id number] Internal Medicine    CONSULTATION / HISTORY AND PHYSICAL EXAMINATION            Date:   6/23/2022  Patient name:  He Bustos  Date of admission:  6/23/2022 10:07 AM  MRN:   [id number]  Account:  [de-identified]  YOB: 1950  PCP:    No primary care provider on file. Room:   2058/2058-01  Code Status:    Full Code    Physician Requesting Consult: Chan Queen MD    Reason for Consult:  Medical management    Chief Complaint:     Chief Complaint   Patient presents with   Dimitry Roche    Hip Pain     left        History Obtained From:     Patient, EMR, nursing staff    History of Present Illness:   68-year-old presenting with fall which happened 1 hour prior to presentation  Patient tripped on the grocery cart and landed on concrete  [place] on left hip, fracture noted on x-rays in ER    Medical history includes hypertension-she is not taking any medication for this  HTN  Onset more than 5 years ago  Vivian: mild to mod  Not associated with headaches or blurry vision  No chest pain        Past Medical History:     Past Medical History:   Diagnosis Date    Hypertension         Past Surgical History:     History reviewed. No pertinent surgical history. Medications Prior to Admission:     Prior to Admission medications    Medication Sig Start Date End Date Taking? Authorizing Provider   Aspirin-Acetaminophen-Caffeine (EXCEDRIN PO) Take by mouth as needed   Yes Historical Provider, MD   aspirin 81 MG EC tablet Take 81 mg by mouth daily   Yes Historical Provider, MD        Allergies:     Pcn [penicillins]    Social History:     Tobacco:    reports that she has been smoking cigarettes. She does not have any smokeless tobacco history on file. Alcohol:      reports no history of alcohol use. Drug Use:  reports no history of drug use. Family History:     History reviewed. No pertinent family history.     Review of Systems:     Positive and Negative as described in HPI.    CONSTITUTIONAL:  negative for fevers, chills, sweats, fatigue, weight loss  HEENT:  negative for vision, hearing changes, runny nose, throat pain  RESPIRATORY:  negative for shortness of breath, cough, congestion, wheezing. CARDIOVASCULAR:  negative for chest pain, palpitations. GASTROINTESTINAL:  negative for nausea, vomiting, diarrhea, constipation, change in bowel habits, abdominal pain   GENITOURINARY:  negative for difficulty of urination, burning with urination, frequency   INTEGUMENT:  negative for rash, skin lesions, easy bruising   HEMATOLOGIC/LYMPHATIC:  negative for swelling/edema   ALLERGIC/IMMUNOLOGIC:  negative for urticaria , itching  ENDOCRINE:  negative increase in drinking, increase in urination, hot or cold intolerance  MUSCULOSKELETAL: Left hip pain, negative joint pains, muscle aches, swelling of joints  NEUROLOGICAL:  negative for headaches, dizziness, lightheadedness, numbness, pain, tingling extremities      Physical Exam:     BP (!) 179/93   Pulse 71   Temp 97.5 °F (36.4 °C)   Resp 18   Ht 5' 5\" (1.651 m)   Wt 98 lb (44.5 kg)   SpO2 95%   BMI 16.31 kg/m²   Temp (24hrs), Av.6 °F (36.4 °C), Min:97.5 °F (36.4 °C), Max:97.9 °F (36.6 °C)    No results for input(s): POCGLU in the last 72 hours. No intake or output data in the 24 hours ending 22 1339    General Appearance:  alert, well appearing, and in no acute distress  Head:  normocephalic, atraumatic. Eye: no icterus, redness, pupils equal and reactive, extraocular eye movements intact, conjunctiva clear  Ear: normal external ear, no discharge, hearing intact  Nose:  no drainage noted  Mouth: mucous membranes moist  Neck: supple, no carotid bruits, thyroid not palpable  Lungs: Bilateral equal air entry, clear to ausculation, no wheezing, rales or rhonchi, normal effort  Cardiovascular: normal rate, regular rhythm, no murmur, gallop, rub.   Abdomen: Soft, nontender, nondistended, normal bowel sounds, no hepatomegaly or splenomegaly  Neurologic: There are no new focal motor or sensory deficits, normal muscle tone and bulk, no abnormal sensation, normal speech, cranial nerves II through XII grossly intact  Skin: No gross lesions, rashes, bruising or bleeding on exposed skin area  Extremities: Left groin pain, peripheral pulses palpable, no pedal edema or calf pain with palpation  Psych: normal affect    Investigations:      Laboratory Testing:  Recent Results (from the past 24 hour(s))   CBC with Auto Differential    Collection Time: 06/23/22 10:35 AM   Result Value Ref Range    WBC 6.8 3.5 - 11.0 k/uL    RBC 4.54 4.0 - 5.2 m/uL    Hemoglobin 13.7 12.0 - 16.0 g/dL    Hematocrit 42.0 36 - 46 %    MCV 92.4 80 - 100 fL    MCH 30.2 26 - 34 pg    MCHC 32.7 31 - 37 g/dL    RDW 15.9 (H) 11.5 - 14.9 %    Platelets 984 002 - 722 k/uL    MPV 9.0 6.0 - 12.0 fL    Seg Neutrophils 87 (H) 36 - 66 %    Lymphocytes 7 (L) 24 - 44 %    Monocytes 4 1 - 7 %    Eosinophils % 1 0 - 4 %    Basophils 1 0 - 2 %    Segs Absolute 5.90 1.3 - 9.1 k/uL    Absolute Lymph # 0.50 (L) 1.0 - 4.8 k/uL    Absolute Mono # 0.30 0.1 - 1.3 k/uL    Absolute Eos # 0.00 0.0 - 0.4 k/uL    Basophils Absolute 0.00 0.0 - 0.2 k/uL   Comprehensive Metabolic Panel    Collection Time: 06/23/22 10:35 AM   Result Value Ref Range    Glucose 105 (H) 70 - 99 mg/dL    BUN 13 8 - 23 mg/dL    CREATININE 0.46 (L) 0.50 - 0.90 mg/dL    Calcium 8.9 8.6 - 10.4 mg/dL    Sodium 140 135 - 144 mmol/L    Potassium 3.6 (L) 3.7 - 5.3 mmol/L    Chloride 103 98 - 107 mmol/L    CO2 26 20 - 31 mmol/L    Anion Gap 11 9 - 17 mmol/L    Alkaline Phosphatase 95 35 - 104 U/L    ALT 10 5 - 33 U/L    AST 16 <32 U/L    Total Bilirubin 0.80 0.3 - 1.2 mg/dL    Total Protein 6.8 6.4 - 8.3 g/dL    Albumin 4.3 3.5 - 5.2 g/dL    GFR Non-African American >60 >60 mL/min    GFR African American >60 >60 mL/min    GFR Comment         Protime-INR    Collection Time: 06/23/22 10:35 AM   Result Value Ref Range    Protime 12.8 11.8 - 14.6 sec    INR 1.0    EKG 12 Lead    Collection Time: 06/23/22 10:35 AM   Result Value Ref Range    Ventricular Rate 116 BPM    Atrial Rate 116 BPM    P-R Interval 178 ms    QRS Duration 80 ms    Q-T Interval 336 ms    QTc Calculation (Bazett) 467 ms    P Axis 60 degrees    R Axis -72 degrees    T Axis 59 degrees       Imaging/Diagonstics:  Recent data reviewed    Assessment :      Primary Problem  Closed left hip fracture, initial encounter [facility])    Active Hospital Problems    Diagnosis Date Noted    Closed left hip fracture, initial encounter ([facility] [employer] 06/23/2022     Priority: Medium       Plan:     1. Left hip #-plan for surgery today  2. Hypertension- patient denies being on any medication for hypertension prior to admission, elevated blood pressure likely due to pain-will review after pain medication  3. Malnutrition BMI 16    DVT prophylaxis-mechanical prior to surgery    Consultations:   Chen Atkins MD  6/23/2022  1:39 PM    Copy sent to Dr. Saskia German primary care provider on file. Please note that this chart was generated using voice recognition Dragon dictation software. Although every effort was made to ensure the accuracy of this automated transcription, some errors in transcription may have occurred.

## 2022-06-23 NOTE — ED PROVIDER NOTES
EMERGENCY DEPARTMENT ENCOUNTER    Pt Name: He Bustos  MRN: [id number]  [place] [id number]  Date of evaluation: 6/23/22  CHIEF COMPLAINT       Chief Complaint   Patient presents with    Fall    Hip Pain     left      HISTORY OF PRESENT ILLNESS     Fall  The accident occurred less than 1 hour ago. The fall occurred while walking. She landed on concrete. The point of impact was the left hip. The pain is present in the left hip. The pain is at a severity of 10/10. The pain is severe. She was not ambulatory at the scene. There was no drug use involved in the accident. There was no alcohol use involved in the accident. Exacerbated by: movement of hip. She has tried nothing for the symptoms. Was going into store  Was pushing grocery cart, it steered away and she fell the other way, landed on left hip, severe pain  Denies head injury  No neck or back pain  Has chronic nodule anterior chest for years she wants looked at, no PCP      REVIEW OF SYSTEMS     Review of Systems   All other systems reviewed and are negative. PASTMEDICAL HISTORY     Past Medical History:   Diagnosis Date    Hypertension      Past Problem List  There is no problem list on file for this patient. SURGICAL HISTORY     History reviewed. No pertinent surgical history. CURRENT MEDICATIONS       Previous Medications    ASPIRIN-ACETAMINOPHEN-CAFFEINE (EXCEDRIN PO)    Take by mouth as needed     ALLERGIES     is allergic to pcn [penicillins]. FAMILY HISTORY     has no family status information on file.       SOCIAL HISTORY       Social History     Tobacco Use    Smoking status: Current Every Day Smoker     Types: Cigarettes    Smokeless tobacco: Not on file   Substance Use Topics    Alcohol use: Never    Drug use: Never     PHYSICAL EXAM     INITIAL VITALS: BP (!) 172/112   Pulse 86   Temp 97.5 °F (36.4 °C) (Oral)   Resp 20   Ht 5' 5\" (1.651 m)   Wt 98 lb (44.5 kg)   SpO2 96%   BMI 16.31 kg/m²    Physical Exam  Constitutional: General: She is not in acute distress. Appearance: Normal appearance. She is well-developed. She is not diaphoretic. HENT:      Head: Normocephalic and atraumatic. Right Ear: External ear normal.      Left Ear: External ear normal.      Nose: Nose normal. No congestion. Mouth/Throat:      Mouth: Mucous membranes are moist.      Pharynx: Oropharynx is clear. Eyes:      General:         Right eye: No discharge. Left eye: No discharge. Conjunctiva/sclera: Conjunctivae normal.      Pupils: Pupils are equal, round, and reactive to light. Neck:      Trachea: No tracheal deviation. Cardiovascular:      Rate and Rhythm: Normal rate and regular rhythm. Pulses: Normal pulses. Heart sounds: Normal heart sounds. Pulmonary:      Effort: Pulmonary effort is normal. No respiratory distress. Breath sounds: Normal breath sounds. No stridor. No wheezing or rales. Abdominal:      Palpations: Abdomen is soft. Tenderness: There is no abdominal tenderness. There is no guarding or rebound. Musculoskeletal:         General: Tenderness and signs of injury present. No deformity. Normal range of motion. Cervical back: Normal range of motion and neck supple. Right lower leg: No edema. Left lower leg: No edema. Comments: Left hip tenderness and deformity, pain with rom, LLE shortened with ex rotation   Skin:     General: Skin is warm and dry. Capillary Refill: Capillary refill takes less than 2 seconds. Findings: No erythema or rash. Comments: 1 cm nodule ant sternum, chronic, mild erythema, nontender   Neurological:      General: No focal deficit present. Mental Status: She is alert and oriented to person, place, and time.       Coordination: Coordination normal.      Comments: GCS 15  LLE neuro intact motor and sensory  Strength and sensation normal arms and right leg   Psychiatric:         Mood and Affect: Mood normal.         Behavior: Behavior normal.         Thought Content: Thought content normal.         Judgment: Judgment normal.         MEDICAL DECISION MAKING:       ED Course as of 06/23/22 1132   Thu Jun 23, 2022   1124 Left hip fx, will admit to ortho [WM]      ED Course User Index  [WM] Rhoda Stroud MD     11:32 AM EDT  DW Dr Annmarie Cain accepts admit, consult medicine on floor, npo, iv fluids    Procedures    DIAGNOSTIC RESULTS   EKG:All EKG's are interpreted by the Emergency Department Physician who either signs or Co-signs this chart in the absence of a cardiologist.  ST rate 116 bpm, nonspecific changes, normal rate and normal intervals        RADIOLOGY:All plain film, CT, MRI, and formal ultrasound images (except ED bedside ultrasound) are read by the radiologist, see reports below, unless otherwisenoted in MDM or here. XR HIP 2-3 VW W PELVIS LEFT   Final Result   Comminuted intertrochanteric fracture left femoral neck           LABS: All lab results were reviewed by myself, and all abnormals are listed below.   Labs Reviewed   CBC WITH AUTO DIFFERENTIAL - Abnormal; Notable for the following components:       Result Value    RDW 15.9 (*)     Seg Neutrophils 87 (*)     Lymphocytes 7 (*)     Absolute Lymph # 0.50 (*)     All other components within normal limits   COMPREHENSIVE METABOLIC PANEL - Abnormal; Notable for the following components:    Glucose 105 (*)     CREATININE 0.46 (*)     Potassium 3.6 (*)     All other components within normal limits   PROTIME-INR       EMERGENCY DEPARTMENTCOURSE:         Vitals:    Vitals:    06/23/22 1008   BP: (!) 172/112   Pulse: 86   Resp: 20   Temp: 97.5 °F (36.4 °C)   TempSrc: Oral   SpO2: 96%   Weight: 98 lb (44.5 kg)   Height: 5' 5\" (1.651 m)       The patient was given the following medications while in the emergency department:  Orders Placed This Encounter   Medications    HYDROmorphone (DILAUDID) injection 0.5 mg     CONSULTS:  IP CONSULT TO ORTHOPEDIC SURGERY  IP CONSULT TO INTERNAL MEDICINE    FINAL IMPRESSION      1. Closed left hip fracture, initial encounter ([address]          DISPOSITION/PLAN   DISPOSITION        PATIENT REFERRED TO:  No follow-up provider specified. DISCHARGE MEDICATIONS:  New Prescriptions    No medications on file     The care is provided during an unprecedented national emergency due to the novel coronavirus, COVID 19.   MD Eliseo Dorman MD  06/23/22 1182

## 2022-06-24 ENCOUNTER — APPOINTMENT (OUTPATIENT)
Dept: NON INVASIVE DIAGNOSTICS | Age: 72
DRG: 481 | End: 2022-06-24
Payer: MEDICARE

## 2022-06-24 LAB
ANION GAP SERPL CALCULATED.3IONS-SCNC: 13 MMOL/L (ref 9–17)
BUN BLDV-MCNC: 11 MG/DL (ref 8–23)
CALCIUM SERPL-MCNC: 8.6 MG/DL (ref 8.6–10.4)
CHLORIDE BLD-SCNC: 102 MMOL/L (ref 98–107)
CO2: 25 MMOL/L (ref 20–31)
CREAT SERPL-MCNC: 0.61 MG/DL (ref 0.5–0.9)
EKG ATRIAL RATE: 116 BPM
EKG P AXIS: 60 DEGREES
EKG P-R INTERVAL: 178 MS
EKG Q-T INTERVAL: 336 MS
EKG QRS DURATION: 80 MS
EKG QTC CALCULATION (BAZETT): 467 MS
EKG R AXIS: -72 DEGREES
EKG T AXIS: 59 DEGREES
EKG VENTRICULAR RATE: 116 BPM
GFR AFRICAN AMERICAN: >60 ML/MIN
GFR NON-AFRICAN AMERICAN: >60 ML/MIN
GFR SERPL CREATININE-BSD FRML MDRD: ABNORMAL ML/MIN/{1.73_M2}
GLUCOSE BLD-MCNC: 206 MG/DL (ref 70–99)
HCT VFR BLD CALC: 36.3 % (ref 36–46)
HEMOGLOBIN: 12.1 G/DL (ref 12–16)
LV EF: 68 %
LVEF MODALITY: NORMAL
MCH RBC QN AUTO: 31.1 PG (ref 26–34)
MCHC RBC AUTO-ENTMCNC: 33.3 G/DL (ref 31–37)
MCV RBC AUTO: 93.3 FL (ref 80–100)
PDW BLD-RTO: 16 % (ref 11.5–14.9)
PLATELET # BLD: 154 K/UL (ref 150–450)
PMV BLD AUTO: 9 FL (ref 6–12)
POTASSIUM SERPL-SCNC: 4.5 MMOL/L (ref 3.7–5.3)
RBC # BLD: 3.89 M/UL (ref 4–5.2)
SODIUM BLD-SCNC: 140 MMOL/L (ref 135–144)
WBC # BLD: 8.1 K/UL (ref 3.5–11)

## 2022-06-24 PROCEDURE — 1200000000 HC SEMI PRIVATE

## 2022-06-24 PROCEDURE — 93306 TTE W/DOPPLER COMPLETE: CPT

## 2022-06-24 PROCEDURE — 97166 OT EVAL MOD COMPLEX 45 MIN: CPT

## 2022-06-24 PROCEDURE — 93010 ELECTROCARDIOGRAM REPORT: CPT | Performed by: INTERNAL MEDICINE

## 2022-06-24 PROCEDURE — 6360000002 HC RX W HCPCS: Performed by: ORTHOPAEDIC SURGERY

## 2022-06-24 PROCEDURE — 97530 THERAPEUTIC ACTIVITIES: CPT

## 2022-06-24 PROCEDURE — 36415 COLL VENOUS BLD VENIPUNCTURE: CPT

## 2022-06-24 PROCEDURE — 97162 PT EVAL MOD COMPLEX 30 MIN: CPT

## 2022-06-24 PROCEDURE — 80048 BASIC METABOLIC PNL TOTAL CA: CPT

## 2022-06-24 PROCEDURE — 97535 SELF CARE MNGMENT TRAINING: CPT

## 2022-06-24 PROCEDURE — 99232 SBSQ HOSP IP/OBS MODERATE 35: CPT | Performed by: INTERNAL MEDICINE

## 2022-06-24 PROCEDURE — 85027 COMPLETE CBC AUTOMATED: CPT

## 2022-06-24 PROCEDURE — 6370000000 HC RX 637 (ALT 250 FOR IP): Performed by: ORTHOPAEDIC SURGERY

## 2022-06-24 RX ADMIN — CEFAZOLIN 2000 MG: 10 INJECTION, POWDER, FOR SOLUTION INTRAVENOUS at 02:53

## 2022-06-24 RX ADMIN — HYDROCODONE BITARTRATE AND ACETAMINOPHEN 2 TABLET: 5; 325 TABLET ORAL at 08:42

## 2022-06-24 RX ADMIN — HYDROCODONE BITARTRATE AND ACETAMINOPHEN 2 TABLET: 5; 325 TABLET ORAL at 17:33

## 2022-06-24 RX ADMIN — ENOXAPARIN SODIUM 30 MG: 30 INJECTION SUBCUTANEOUS at 08:41

## 2022-06-24 RX ADMIN — CEFAZOLIN 2000 MG: 10 INJECTION, POWDER, FOR SOLUTION INTRAVENOUS at 11:46

## 2022-06-24 ASSESSMENT — PAIN SCALES - GENERAL
PAINLEVEL_OUTOF10: 5
PAINLEVEL_OUTOF10: 9
PAINLEVEL_OUTOF10: 3

## 2022-06-24 ASSESSMENT — PAIN DESCRIPTION - LOCATION
LOCATION: HIP
LOCATION: HIP

## 2022-06-24 ASSESSMENT — PAIN - FUNCTIONAL ASSESSMENT
PAIN_FUNCTIONAL_ASSESSMENT: ACTIVITIES ARE NOT PREVENTED
PAIN_FUNCTIONAL_ASSESSMENT: ACTIVITIES ARE NOT PREVENTED

## 2022-06-24 ASSESSMENT — PAIN DESCRIPTION - DESCRIPTORS
DESCRIPTORS: ACHING;SHARP
DESCRIPTORS: ACHING;SHARP

## 2022-06-24 ASSESSMENT — PAIN DESCRIPTION - ORIENTATION
ORIENTATION: LEFT
ORIENTATION: LEFT

## 2022-06-24 NOTE — ANESTHESIA POSTPROCEDURE EVALUATION
Department of Anesthesiology  Postprocedure Note    Patient: Holli Galvez  MRN: [id number]  YOB: 1950  Date of evaluation: 6/23/2022      Procedure Summary     Date: 06/23/22 Room / Location: [address] / [address]     Anesthesia Start: [address] 76 Anesthesia Stop: 1951    Procedure: HIP TFN (Left Hip) Diagnosis:       Closed fracture of left hip, initial encounter [facility])      (LEFT HIP FRACTURE)      (IP RM [id number])    Surgeons: Chago Miranda MD Responsible Provider: Hussain Arreola MD    Anesthesia Type: general ASA Status: 3 - Emergent          Anesthesia Type: No value filed.     Sonu Phase I:      Sonu Phase II:        Anesthesia Post Evaluation    Comments: POST- ANESTHESIA EVALUATION       Pt Name: Holli Galvez  MRN: [id number]  YOB: 1950  Date of evaluation: 6/23/2022  Time:  8:04 PM      BP (!) 131/96   Pulse 95   Temp 97.3 °F (36.3 °C) (Infrared)   Resp 19   Ht 5' 5\" (1.651 m)   Wt 98 lb (44.5 kg)   SpO2 100%   BMI 16.31 kg/m²      Consciousness Level  Awake  Cardiopulmonary Status  Stable  Pain Adequately Treated YES  Nausea / Vomiting  NO  Adequate Hydration  YES  Anesthesia Related Complications NONE      Electronically signed by Hussain Arreola MD on 6/23/2022 at 8:04 PM

## 2022-06-24 NOTE — PROGRESS NOTES
[facility]    CONSULTATION / HISTORY AND PHYSICAL EXAMINATION            Date:   6/24/2022  Patient name:  Taran Bonilla  Date of admission:  6/23/2022 10:07 AM  MRN:   [id number]  Account:  [de-identified]  YOB: 1950  PCP:    No primary care provider on file. Room:   2058/2058-01  Code Status:    Full Code    Physician Requesting Consult: Aditya Suero MD    Reason for Consult:  Medical management    Chief Complaint:     Chief Complaint   Patient presents with   Megha Ishihara    Hip Pain     left        History Obtained From:     Patient, EMR, nursing staff    History of Present Illness:   25-year-old presenting with fall which happened 1 hour prior to presentation  Patient tripped on the grocery cart and landed on concrete  [place] on left hip, fracture noted on x-rays in ER    Medical history includes hypertension-she is not taking any medication for this  HTN  Onset more than 5 years ago  Vivian: mild to mod  Not associated with headaches or blurry vision  No chest pain        Past Medical History:     Past Medical History:   Diagnosis Date    Hypertension         Past Surgical History:     Past Surgical History:   Procedure Laterality Date    FEMUR FRACTURE SURGERY Left 6/23/2022    HIP TFN performed by Aditya Suero MD at [address]        Medications Prior to Admission:     Prior to Admission medications    Medication Sig Start Date End Date Taking? Authorizing Provider   Aspirin-Acetaminophen-Caffeine (EXCEDRIN PO) Take by mouth as needed   Yes Historical Provider, MD   aspirin 81 MG EC tablet Take 81 mg by mouth daily   Yes Historical Provider, MD        Allergies:     Pcn [penicillins]    Social History:     Tobacco:    reports that she has been smoking cigarettes. She does not have any smokeless tobacco history on file. Alcohol:      reports no history of alcohol use. Drug Use:  reports no history of drug use. Family History:     History reviewed. No pertinent family history. Review of Systems:     Positive and Negative as described in HPI. CONSTITUTIONAL:  negative for fevers, chills, sweats, fatigue, weight loss  HEENT:  negative for vision, hearing changes, runny nose, throat pain  RESPIRATORY:  negative for shortness of breath, cough, congestion, wheezing. CARDIOVASCULAR:  negative for chest pain, palpitations. GASTROINTESTINAL:  negative for nausea, vomiting, diarrhea, constipation, change in bowel habits, abdominal pain   GENITOURINARY:  negative for difficulty of urination, burning with urination, frequency   INTEGUMENT:  negative for rash, skin lesions, easy bruising   HEMATOLOGIC/LYMPHATIC:  negative for swelling/edema   ALLERGIC/IMMUNOLOGIC:  negative for urticaria , itching  ENDOCRINE:  negative increase in drinking, increase in urination, hot or cold intolerance  MUSCULOSKELETAL: Left hip pain, negative joint pains, muscle aches, swelling of joints  NEUROLOGICAL:  negative for headaches, dizziness, lightheadedness, numbness, pain, tingling extremities      Physical Exam:     /86   Pulse 92   Temp 97.9 °F (36.6 °C) (Oral)   Resp 20   Ht 5' 5\" (1.651 m)   Wt 98 lb (44.5 kg)   SpO2 92%   BMI 16.31 kg/m²   Temp (24hrs), Av.9 °F (36.6 °C), Min:97.3 °F (36.3 °C), Max:98.2 °F (36.8 °C)    No results for input(s): POCGLU in the last 72 hours. Intake/Output Summary (Last 24 hours) at 2022 1508  Last data filed at 2022 0548  Gross per 24 hour   Intake 1793.82 ml   Output 25 ml   Net 1768.82 ml       General Appearance:  alert, well appearing, and in no acute distress  Head:  normocephalic, atraumatic.   Eye: no icterus, redness, pupils equal and reactive, extraocular eye movements intact, conjunctiva clear  Ear: normal external ear, no discharge, hearing intact  Nose:  no drainage noted  Mouth: mucous membranes moist  Neck: supple, no carotid bruits, thyroid not palpable  Lungs: Bilateral equal air entry, clear to ausculation, no wheezing, rales or rhonchi, normal effort  Cardiovascular: normal rate, regular rhythm, no murmur, gallop, rub. Abdomen: Soft, nontender, nondistended, normal bowel sounds, no hepatomegaly or splenomegaly  Neurologic: There are no new focal motor or sensory deficits, normal muscle tone and bulk, no abnormal sensation, normal speech, cranial nerves II through XII grossly intact  Skin: No gross lesions, rashes, bruising or bleeding on exposed skin area  Extremities: Left groin pain, peripheral pulses palpable, no pedal edema or calf pain with palpation  Psych: normal affect    Investigations:      Laboratory Testing:  Recent Results (from the past 24 hour(s))   CBC    Collection Time: 06/24/22  5:51 AM   Result Value Ref Range    WBC 8.1 3.5 - 11.0 k/uL    RBC 3.89 (L) 4.0 - 5.2 m/uL    Hemoglobin 12.1 12.0 - 16.0 g/dL    Hematocrit 36.3 36 - 46 %    MCV 93.3 80 - 100 fL    MCH 31.1 26 - 34 pg    MCHC 33.3 31 - 37 g/dL    RDW 16.0 (H) 11.5 - 14.9 %    Platelets 928 578 - 838 k/uL    MPV 9.0 6.0 - 12.0 fL   Basic Metabolic Panel    Collection Time: 06/24/22  5:51 AM   Result Value Ref Range    Glucose 206 (H) 70 - 99 mg/dL    BUN 11 8 - 23 mg/dL    CREATININE 0.61 0.50 - 0.90 mg/dL    Calcium 8.6 8.6 - 10.4 mg/dL    Sodium 140 135 - 144 mmol/L    Potassium 4.5 3.7 - 5.3 mmol/L    Chloride 102 98 - 107 mmol/L    CO2 25 20 - 31 mmol/L    Anion Gap 13 9 - 17 mmol/L    GFR Non-African American >60 >60 mL/min    GFR African American >60 >60 mL/min    GFR Comment             Imaging/Diagonstics:  Recent data reviewed    Assessment :      Primary Problem  Closed left hip fracture, initial encounter [facility])    Active Hospital Problems    Diagnosis Date Noted    [facility] Cecilio Huge 06/23/2022     Priority: Medium       Plan:     1. Left hip #-plan for surgery today  2.  Hypertension- patient denies being on any medication for hypertension prior to admission, elevated blood pressure likely due to pain-will review after pain medication  3. Malnutrition BMI 16    DVT prophylaxis-mechanical prior to surgery    6/24  POD #1 status post intramedullary fixation of left hip fracture  Abnormal EKG noted this morning, cardiology was consulted  Echo has been ordered and pending  Blood pressure controlled  Has passedurine since procedure, has had a BM      Consultations:   [id number] [facility] TO Cheryle Baez MD  6/24/2022  3:08 PM    Copy sent to Dr. rFan Spring primary care provider on file. Please note that this chart was generated using voice recognition Dragon dictation software. Although every effort was made to ensure the accuracy of this automated transcription, some errors in transcription may have occurred.

## 2022-06-24 NOTE — PROGRESS NOTES
Surgical Progress Note    POD: 1    Patient doing well  Vitals:    22 1438   BP: 115/86   Pulse: 92   Resp: 20   Temp: 97.9 °F (36.6 °C)   SpO2: 92%      Temp (24hrs), Av.9 °F (36.6 °C), Min:97.3 °F (36.3 °C), Max:98.2 °F (36.8 °C)       Pain Control good  No unusual nausea    Exam: reports getting around well with walker        Lungs:  No respiratory distress    Labs reviewed:  Labs:  WBC/Hgb/Hct/Plts:  8.1/12.1/36.3/154 (551)  BUN/Cr/glu/ALT/AST/amyl/lip:  11/0.61/--/--/--/--/-- ( 162)  PT/INR/PTT:  12.8/1.0/-- ( 1035)  Na/K/Cl/CO2:  140/4.5/102/25 (551)    I/O last 3 completed shifts:   In: 1793.8 [I.V.:1747; IV Piggyback:46.8]  Out: 25 [Blood:25]    Assessment:    Patient Active Problem List   Diagnosis    Closed left hip fracture, initial encounter [facility])       Plan:  See my orders  Likely discharge to home(daughters) tomorrow  Hussein Cintron MD MD  2022 5:13 PM

## 2022-06-24 NOTE — ACP (ADVANCE CARE PLANNING)
Advance Care Planning     Advance Care Planning Activator (Inpatient)  Conversation Note      Date of ACP Conversation: 6/24/2022     Conversation Conducted with: Patient with Decision Making Capacity    ACP Activator: iMnal Guerra RN        Health Care Decision Maker:     Current Designated Health Care Decision Maker:     Click here to complete Healthcare Decision Makers including section of the Healthcare Decision Maker Relationship (ie \"Primary\")  Today we documented Decision Maker(s) consistent with Legal Next of Kin hierarchy. Care Preferences    Ventilation: \"If you were in your present state of health and suddenly became very ill and were unable to breathe on your own, what would your preference be about the use of a ventilator (breathing machine) if it were available to you? \"      Would the patient desire the use of ventilator (breathing machine)?: yes    \"If your health worsens and it becomes clear that your chance of recovery is unlikely, what would your preference be about the use of a ventilator (breathing machine) if it were available to you? \"     Would the patient desire the use of ventilator (breathing machine)?: Yes      Resuscitation  \"CPR works best to restart the heart when there is a sudden event, like a heart attack, in someone who is otherwise healthy. Unfortunately, CPR does not typically restart the heart for people who have serious health conditions or who are very sick. \"    \"In the event your heart stopped as a result of an underlying serious health condition, would you want attempts to be made to restart your heart (answer \"yes\" for attempt to resuscitate) or would you prefer a natural death (answer \"no\" for do not attempt to resuscitate)? \" yes       [x] Yes   [] No   Educated Patient / Florene Billing regarding differences between Advance Directives and portable DNR orders.     Length of ACP Conversation in minutes:      Conversation Outcomes:  [x] ACP discussion completed  [] Existing advance directive reviewed with patient; no changes to patient's previously recorded wishes  [] New Advance Directive completed  [] Portable Do Not Rescitate prepared for Provider review and signature  [] POLST/POST/MOLST/MOST prepared for Provider review and signature      Follow-up plan:    [] Schedule follow-up conversation to continue planning  [] Referred individual to Provider for additional questions/concerns   [] Advised patient/agent/surrogate to review completed ACP document and update if needed with changes in condition, patient preferences or care setting    [] This note routed to one or more involved healthcare providers

## 2022-06-24 NOTE — CARE COORDINATION
SW was informed by PT that they were recommending inpatient rehab. SW sent referral to ARU. PM&R consult needed.      Electronically signed by ONDINA Lan on 6/24/2022 at 10:55 AM

## 2022-06-24 NOTE — PROGRESS NOTES
A Consult was placed for Cardiology and Dr on call was paged and Dr Rell Calvert returned page and was notified and will see patient today. .. thank you!

## 2022-06-24 NOTE — PLAN OF CARE
Problem: Discharge Planning  Goal: Discharge to home or other facility with appropriate resources  Outcome: Progressing  Flowsheets (Taken 6/23/2022 2100)  Discharge to home or other facility with appropriate resources:   Identify barriers to discharge with patient and caregiver   Arrange for needed discharge resources and transportation as appropriate   Identify discharge learning needs (meds, wound care, etc)   Refer to discharge planning if patient needs post-hospital services based on physician order or complex needs related to functional status, cognitive ability or social support system     Problem: Pain  Goal: Verbalizes/displays adequate comfort level or baseline comfort level  6/24/2022 0111 by Elli Dye RN  Outcome: Progressing     Problem: Safety - Adult  Goal: Free from fall injury  6/24/2022 0111 by Elli Dye RN  Outcome: Progressing  Flowsheets (Taken 6/24/2022 0109)  Free From Fall Injury:   Instruct family/caregiver on patient safety   Based on caregiver fall risk screen, instruct family/caregiver to ask for assistance with transferring infant if caregiver noted to have fall risk factors     Problem: ABCDS Injury Assessment  Goal: Absence of physical injury  6/24/2022 0111 by Elli Dye RN  Outcome: Progressing  Flowsheets (Taken 6/24/2022 0109)  Absence of Physical Injury: Implement safety measures based on patient assessment

## 2022-06-24 NOTE — PROGRESS NOTES
Physical Therapy  Facility/Department: [facility] MED SURG  Physical Therapy Initial Assessment    Name: Guillermo Christensen  :   MRN: [id number]  Date of Service: 2022    Discharge Recommendations:  Patient able to tolerate 3hrs of therapy a day,Patient would benefit from continued therapy after discharge   PT Equipment Recommendations  Equipment Needed: Yes  Mobility Devices: Mynor Bruins: Rolling      Patient Diagnosis(es): The encounter diagnosis was Closed left hip fracture, initial encounter ([address] Past Medical History:  has a past medical history of Hypertension. Past Surgical History:  has a past surgical history that includes Femur fracture surgery (Left, 2022). Assessment   Body Structures, Functions, Activity Limitations Requiring Skilled Therapeutic Intervention: Decreased endurance;Decreased functional mobility ; Decreased balance  Assessment: Impaired mobility due to decreased tolerance to activity and safety issue of decreased balance.   Decision Making: Medium Complexity  History: L hip fx with ORIF  Exam: decreased balance, endurance, mobility  Clinical Presentation: evolving  Requires PT Follow-Up: Yes  Activity Tolerance  Activity Tolerance: Patient tolerated evaluation without incident     Plan   Plan  Plan: 1 time a day 7 days a week  Current Treatment Recommendations: Functional mobility training,Transfer training,Gait training,Safety education & training  Safety Devices  Type of Devices: Patient at risk for falls,Call light within reach,Left in chair     Restrictions  Restrictions/Precautions  Restrictions/Precautions: Weight Bearing,Fall Risk,General Precautions,Up as Tolerated  Required Braces or Orthoses?: No  Implants present? : Metal implants (LLE TFN)  Lower Extremity Weight Bearing Restrictions  Left Lower Extremity Weight Bearing: Weight Bearing As Tolerated     Subjective   Pain: Pt denies pain at rest  General  Patient assessed for rehabilitation services?: Yes  Family / Caregiver Present: No  Follows Commands: Within Functional Limits  General Comment  Comments: pt had L Femur fx with ORIF on 6-23-22  Subjective  Subjective: pt pleasant and cooperative         Social/Functional History  Social/Functional History  Lives With: Alone  Type of Home: Trailer (Camper trailer next to daughters house)  Home Layout: One level  Home Access: Stairs to enter with rails  Entrance Stairs - Number of Steps: 2+1  Entrance Stairs - Rails: Left  Bathroom Shower/Tub: Tub/Shower unit,Curtain,Shower chair with back (Takes shower at daughters)  Bathroom Toilet: Standard (Use daughters bathroom)  Bathroom Equipment: Hand-held shower  Bathroom Accessibility: Walker accessible  Home Equipment: Gian Cavanaugh  Has the patient had two or more falls in the past year or any fall with injury in the past year?: No (1 fall that brought pt in)  ADL Assistance: Independent (daughter A into shower)  Homemaking Assistance: Independent (Pt cooks; laundry in sink; neighbors A with groceries)  Homemaking Responsibilities: Yes  Ambulation Assistance: Independent  Transfer Assistance: Independent  Active : No  Patient's  Info: medical transport  IADL Comments: Pt reports sleeping in flat bed  Additional Comments: Pt reports that daughter has 1 EDEL with HR on left. Pt reports that daughter is home all the time and able to assist as needed  Vision/Hearing  Vision  Vision: Impaired (R eye blurry)  Vision Exceptions: Wears glasses for reading  Hearing  Hearing: Within functional limits    Cognition   Orientation  Overall Orientation Status: Within Functional Limits     Objective       AROM RLE (degrees)  RLE AROM: WNL  AROM LLE (degrees)  LLE AROM : WFL  Strength RLE  Strength RLE: WNL  Strength LLE  Comment: not tested due to fx           Bed mobility  Supine to Sit: Moderate assistance  Sit to Supine:  (pt left up in chair)  Scooting:  Moderate assistance  Transfers  Sit to Stand: Contact guard assistance  Stand to sit: Contact guard assistance  Bed to Chair: Contact guard assistance (with RW)  Ambulation  WB Status: WBAT  Ambulation  Surface: level tile  Device: Rolling Walker  Assistance: Contact guard assistance  Gait Deviations: Slow Sherrell;Decreased step length;Decreased step height  Distance: 30ft  Comments: pt able to place minimal wt on L LE  Stairs/Curb  Stairs?: No     Balance  Sitting - Static: Good  Sitting - Dynamic: Good  Standing - Static: Fair (CGA with RW)  Standing - Dynamic: Fair (CGA with RW)         Goals  Short Term Goals  Time Frame for Short term goals: 2-3 days  Short term goal 1: bed mobility with Susan  Short term goal 2: transfers with SBA for safe ADLs  Short term goal 3: ambulate with RW x 40-45ft with SBA       Education  Patient Education  Education Provided: Equipment;Transfer Training  Education Provided Comments: instructed in use of RW  Education Method: Demonstration;Verbal  Barriers to Learning: None  Education Outcome: Demonstrated understanding      Therapy Time   Individual Concurrent Group Co-treatment   Time In          Time Out 0957         Minutes 26         Timed Code Treatment Minutes: Angelika Cooper 31, PT

## 2022-06-24 NOTE — PLAN OF CARE
Problem: Discharge Planning  Goal: Discharge to home or other facility with appropriate resources  Outcome: Progressing  Flowsheets  Taken 6/24/2022 1525  Discharge to home or other facility with appropriate resources: Identify barriers to discharge with patient and caregiver  Taken 6/24/2022 0800  Discharge to home or other facility with appropriate resources: Identify barriers to discharge with patient and caregiver     Problem: Pain  Goal: Verbalizes/displays adequate comfort level or baseline comfort level  Outcome: Progressing  Flowsheets (Taken 6/24/2022 1525)  Verbalizes/displays adequate comfort level or baseline comfort level:   Encourage patient to monitor pain and request assistance   Assess pain using appropriate pain scale   Administer analgesics based on type and severity of pain and evaluate response  Note: Pain tolerable with norco     Problem: Safety - Adult  Goal: Free from fall injury  Outcome: Progressing  Flowsheets  Taken 6/24/2022 1525  Free From Fall Injury:   Instruct family/caregiver on patient safety   Based on caregiver fall risk screen, instruct family/caregiver to ask for assistance with transferring infant if caregiver noted to have fall risk factors  Taken 6/24/2022 1236  Free From Fall Injury:   Instruct family/caregiver on patient safety   Based on caregiver fall risk screen, instruct family/caregiver to ask for assistance with transferring infant if caregiver noted to have fall risk factors     Problem: ABCDS Injury Assessment  Goal: Absence of physical injury  Outcome: Progressing  Flowsheets (Taken 6/24/2022 0109 by Helene Read RN)  Absence of Physical Injury: Implement safety measures based on patient assessment

## 2022-06-24 NOTE — CARE COORDINATION
CASE MANAGEMENT NOTE:    Admission Date:  6/23/2022 Alexy Martinez is a 74 y.o.  female    Admitted for : Closed left hip fracture, initial encounter ([facility] [address] Pauline Crigler    Met with:  Family    PCP:  none                                Insurance:  [employer]      Is patient alert and oriented at time of discussion:  Yes    Current Residence/ Living Arrangements:  independently at home in camper at dtr's home             Current Services PTA:  No    Does patient go to outpatient dialysis: No  If yes, location and chair time: NA    Is patient agreeable to VNS: No    Freedom of choice provided:  No    List of [address] provided: No    VNS chosen:  No    DME:  straight cane and wheelchair    Home Oxygen: No    Nebulizer: No    CPAP/BIPAP: No    Supplier: N/A    Potential Assistance Needed: No    SNF needed: Yes- sw notified for SNF vs ARU    [place] of choice and list provided: NA    Pharmacy:  [facility]      Is patient currently receiving oral anticoagulation therapy? No    Is the Patient an [facility] with Readmission Risk Score greater than 14%? No  If yes, pt needs a follow up appointment made within 7 days. Family Members/Caregivers that pt would like involved in their care:    Yes    If yes, list name here:  P[address] and [address]    Transportation Provider:  family             Discharge Plan:  6/24/22 [facility] MEDICARE from home DME:cane, wheelchair  VNS: none POD#1 left hip TFN. PT/OT eval . HAMMAD NEEDS TO BE SIGNED/COMPLETED.  Following for needs//JF              Electronically signed by: Edd Fong RN on 6/24/2022 at 8:57 AM

## 2022-06-24 NOTE — PROGRESS NOTES
[address]   Occupational Therapy Evaluation  Date: 22  Patient Name: Alexy Martinez       Room: 2384/9903-83  MRN: [id number]  Account: [de-identified]   : 1950  (69 y.o.) Gender: female     Discharge Recommendations: The patient would benefit from an intensive level of therapy after discharge from the facility. They should be able to tolerate 3-hours of Combined OT/PT/ST over 5 days/week or at least 900 minutes of  Combined Therapy over 7 days. OT Equipment Recommendations  Other: TBD    Referring Practitioner: Arabella Castro MD  Diagnosis: Closed left hip fracture       Treatment Diagnosis: impaired self care status  Past Medical History:  has a past medical history of Hypertension. Past Surgical History:   has a past surgical history that includes Femur fracture surgery (Left, 2022).     Restrictions  Restrictions/Precautions: Weight Bearing,Fall Risk,General Precautions,Up as Tolerated  Implants present? : Metal implants (LLE TFN)  Left Lower Extremity Weight Bearing: Weight Bearing As Tolerated  Required Braces or Orthoses?: No     Vitals  Temp: 97.9 °F (36.6 °C)  Heart Rate: 92  Resp: 20  BP: 115/86  Height: 5' 5\" (165.1 cm)  Weight: 98 lb (44.5 kg)  BMI (Calculated): 16.3  Oxygen Therapy  SpO2: 92 %  Pulse Oximeter Device Mode: Continuous  Pulse Oximeter Device Location: Right,Hand,Finger  O2 Device: Nasal cannula  O2 Flow Rate (L/min): 2 L/min  Level of Consciousness: Alert (0)    Subjective  Subjective: Pt resting in bed upon arrival. Pt was pleasant and agreeable to OT/PT eval  Comments: Ok per [employer] for OT/PT eval     Vision  Vision Exceptions: Wears glasses for reading  Hearing  Hearing: Within functional limits  Social/Functional History  Lives With: Alone  Type of Home: Trailer ([place] trailer next to daughters house)  Home Layout: One level  Home Access: Stairs to enter with rails  Entrance Stairs - Number of Steps: 2+1  Entrance Stairs - Rails: Left  Bathroom Shower/Tub: Tub/Shower unit,Curtain,Shower chair with back (Takes shower at daughters)  Bathroom Toilet: Standard (Use daughters bathroom)  Bathroom Equipment: Hand-held shower  Bathroom Accessibility: Walker accessible  Home Equipment: Minta Ruffing  Has the patient had two or more falls in the past year or any fall with injury in the past year?: No (1 fall that brought pt in)  ADL Assistance: Independent (daughter A into shower)  Homemaking Assistance: Independent (Pt cooks; laundry in sink; neighbors A with groceries)  Homemaking Responsibilities: Yes  Ambulation Assistance: Independent  Transfer Assistance: Independent  Active : No  Patient's  Info: medical transport  IADL Comments: Pt reports sleeping in flat bed  Additional Comments: Pt reports that daughter has 1 EDEL with HR on left. Pt reports that daughter is home all the time and able to assist as needed       Objective      Cognition  Overall Cognitive Status: Exceptions  Following Commands: Follows one step commands with repetition  Attention Span: Attends with cues to redirect  Safety Judgement: Decreased awareness of need for assistance  Problem Solving: Assistance required to generate solutions,Assistance required to implement solutions,Assistance required to identify errors made,Assistance required to correct errors made  Insights: Decreased awareness of deficits   Sensation  Overall Sensation Status: WFL (Pt denies)   ADL  Feeding: Setup  Grooming: Setup  UE Bathing: Stand by assistance  LE Bathing: Minimal assistance  UE Dressing: Stand by assistance  LE Dressing: Moderate assistance  Toileting: Contact guard assistance  Toileting Skilled Clinical Factors: pt completed toileting with CGA for hygiene while standing   Additional Comments: ADL scores based on clinical reasoning and skilled observation unless otherwise noted.  Pt currently limited due to decreased strength, balance, and ROM in L hip impacting safety and independene with self care tasks    UE Function           LUE Strength  Gross LUE Strength: WFL  L Hand General: 4-/5     LUE Tone: Normotonic     LUE AROM (degrees)  LUE AROM : WFL     Left Hand AROM (degrees)  Left Hand AROM: WFL  RUE Strength  Gross RUE Strength: WFL  R Hand General: 4-/5      RUE Tone: Normotonic     RUE AROM (degrees)  RUE AROM : WFL     Right Hand AROM (degrees)  Right Hand AROM: WFL    Fine Motor Skills  Coordination  Movements Are Fluid And Coordinated: Yes                           Mobility  Supine to Sit: Moderate assistance  Sit to Supine: Unable to assess (Pt sitting in chair at end of session)     Balance  Sitting Balance: Stand by assistance  Standing Balance: Contact guard assistance     Functional Mobility  Functional - Mobility Device: Rolling Walker  Activity: To/from bathroom,Other (within room)  Assist Level: Contact guard assistance  Functional Mobility Comments: Verbal cues for hand placement and safety  Bed mobility  Supine to Sit: Moderate assistance  Sit to Supine: Unable to assess (Pt sitting in chair at end of session)  Scooting:  Moderate assistance  Bed Mobility Comments: Bed mobility completed with HOB flat with increased time and use of bed rails     Transfers  Sit to stand: Contact guard assistance  Stand to sit: Contact guard assistance  Transfer Comments: Verbal cues for hand placement and safety   Toilet Transfers  Toilet - Technique: Ambulating  Equipment Used: Grab bars (Handicap height toilet)  Toilet Transfer: Contact guard assistance  Toilet Transfers Comments: Verbal cues for hand placement and safety        Assessment  Assessment  Performance deficits / Impairments: Decreased ADL status,Decreased functional mobility ,Decreased strength,Decreased safe awareness,Decreased cognition,Decreased endurance,Decreased balance,Decreased high-level IADLs  Treatment Diagnosis: impaired self care status  Prognosis: Good  Decision Making: Medium Complexity  Discharge Recommendations: Patient would benefit from continued therapy after discharge  Activity Tolerance: Patient Tolerated treatment well         Functional Outcome Measures  AM-PAC Daily Activity Inpatient   How much help for putting on and taking off regular lower body clothing?: A Lot  How much help for Bathing?: A Lot  How much help for Toileting?: A Little  How much help for putting on and taking off regular upper body clothing?: A Little  How much help for taking care of personal grooming?: A Little  How much help for eating meals?: A Little  AM-[facility] Inpatient Daily Activity Raw Score: 16  AM-PAC Inpatient ADL T-Scale Score : 35.96  ADL Inpatient CMS 0-100% Score: 53.32  ADL Inpatient CMS G-Code Modifier : CK       Goals  Short Term Goals  Time Frame for Short term goals: by discharge  Short Term Goal 1: Pt will complete lower body dressing/bathing with CGA and Good safety with use of AE as needed  Short Term Goal 2: Pt will complete functional transfer/mobility during self care tasks with SBA and Good safety with use of least restrictive device  Short Term Goal 3: Pt will tolerate standing 5+ minutes during functional activity of choice with Good safety  Short Term Goal 4: Pt will verbalize/demonstrate Good understanding of home safety/fall prevention strategies to increase safety and independence with self care and mobility  Short Term Goal 5: Pt will verbalize/demonstrate Good understanding of AE/adaptive strategies/DME to increase safety and independence with self care and mobilituy    Plan        Plan  Times per Week: 5-7  Current Treatment Recommendations: Self-Care / ADL,Strengthening,Balance training,Functional mobility training,Endurance training,Pain management,Safety education & training,Patient/Caregiver education & training,Equipment evaluation, education, & procurement       OT Equipment Recommendations  Other: TBD  OT Individual Minutes  Time In: [place]  Time Out: [address] [facility]  Minutes: 26    Electronically signed by MARQUES Smith/YARELY on 6/24/22 at 4:39 PM EDT

## 2022-06-24 NOTE — OP NOTE
[address]                 [address] [facility], [address]                                OPERATIVE REPORT    PATIENT NAME: Yusef Marrero                        :        1950  MED REC NO:   [id number]                              ROOM:       2058  ACCOUNT NO:   [de-identified]                           ADMIT DATE: 2022  PROVIDER:     Ted Santiago    DATE OF PROCEDURE:  2022    PREOPERATIVE DIAGNOSIS:  Left intertrochanteric hip fracture. POSTOPERATIVE DIAGNOSIS:  Left intertrochanteric hip fracture. PROCEDURE PERFORMED:  Intramedullary fixation, left intertrochanteric  hip fracture. Fluoroscopic assistance. SURGEON:  Ted Santiago MD    ASSISTANT:  None. ANESTHESIA:  General.    ESTIMATED BLOOD LOSS:  Less than 50. COMPLICATIONS:  None. IMPLANTS:  Synthes TFNA. DRAINS:  None. FINDINGS:  The patient with acceptable reduction of fracture and  hardware placement. Final AP and lateral fluoroscopic images showed  acceptable fracture reduction with TFNA placed in what appeared to be a  very acceptable position. PROCEDURE IN DETAIL:  The patient was taken to the operating room,  placed under general anesthesia, and positioned on the [place] table. X-rays, AP and lateral were verified for fracture evaluation. The left  lower extremity was prepped and draped in the usual sterile fashion. An  18-gauge spinal needle was advanced from the skin to the docking  position, checked fluoroscopically. Incision was made. Guide pin was  advanced down the femoral shaft, verified AP and lateral.  Proximal  cortex was drilled. A size 10, 130-degree TFNA nail was then placed  into the canal and placed into appropriate depth. Skin was marked. Lateral incision was made further down the thigh. The  tensor fascia abbie was released. The guide for the guide pin was  placed.   The guide pin was then advanced into the center-center portion  of the

## 2022-06-24 NOTE — PROGRESS NOTES
Surgical Progress Note    POD:      Patient doing fairly well  Vitals:    22 1438   BP: 115/86   Pulse: 92   Resp: 20   Temp: 97.9 °F (36.6 °C)   SpO2: 92%      Temp (24hrs), Av.9 °F (36.6 °C), Min:97.3 °F (36.3 °C), Max:98.2 °F (36.8 °C)       Pain Control good  No unusual nausea    Exam: family reports stand by assist        Lungs:  No respiratory distress    Labs reviewed:  Labs:  WBC/Hgb/Hct/Plts:  8.1/12.1/36.3/154 (551)  BUN/Cr/glu/ALT/AST/amyl/lip:  11/0.61/--/--/--/--/-- ( 96)  PT/INR/PTT:  12.8/1.0/-- ( 1035)  Na/K/Cl/CO2:  140/4.5/102/25 (551)    I/O last 3 completed shifts:   In: 1793.8 [I.V.:1747; IV Piggyback:46.8]  Out: 25 [Blood:25]    Assessment:    Patient Active Problem List   Diagnosis    Closed left hip fracture, initial encounter [facility])       Plan:  See my orders  Likely dc home tomorrow    Mack Ramos MD MD  2022 5:21 PM

## 2022-06-24 NOTE — CONSULTS
[facility]  In PatientCardiology Consult             Date:   6/24/2022  Patient name: Teresia Opitz  Date of admission:  6/23/2022 10:07 AM  MRN:   [id number]  YOB: 1950      Date:   6/24/2022  Patient name: Teresia Opitz  Date of admission:  6/23/2022 10:07 AM  MRN:   [id number]  YOB: 1950    Reason for Admission: Fall and hip fracture    CHIEF COMPLAINT: Hip pain    History Obtained From:  Patient and medical records. HISTORY OF PRESENT ILLNESS:      Patient is 74years old female with no significant cardiac or medical history except for diagnosed hypertension many years ago did not see any physician for more than 10 years she presented to the hospital with mechanical fall and hip fracture underwent surgical intervention last night. Patient was noted to have abnormal EKG and cardiology consult was requested. She denies any anginal type chest pain. No significant dyspnea on exertion orthopnea or PND. Denies any palpitations dizziness or syncope. Past Medical History:   has a past medical history of Hypertension. Past Surgical History:   has a past surgical history that includes Femur fracture surgery (Left, 6/23/2022). Home Medications:    Prior to Admission medications    Medication Sig Start Date End Date Taking? Authorizing Provider   Aspirin-Acetaminophen-Caffeine (EXCEDRIN PO) Take by mouth as needed   Yes Historical Provider, MD   aspirin 81 MG EC tablet Take 81 mg by mouth daily   Yes Historical Provider, MD       Allergies:  Pcn [penicillins]    Social History:   reports that she has been smoking cigarettes. She does not have any smokeless tobacco history on file. She reports that she does not drink alcohol and does not use drugs. Family History: family history is not on file. No h/o sudden cardiac death. REVIEW OF SYSTEMS:    · Constitutional: there has been no unanticipated weight loss.  There's been No change in energy level, No change in activity level. · Eyes: No visual changes or diplopia. No scleral icterus. · ENT: No Headaches, hearing loss or vertigo. No mouth sores or sore throat. · Cardiovascular: As above. · Respiratory: As above. · Gastrointestinal: No abdominal pain, appetite loss, blood in stools. No change in bowel or bladder habits. · Genitourinary: No dysuria, trouble voiding, or hematuria. · Musculoskeletal: Hip fracture with pain as above  · Integumentary: No rash or pruritis. · Neurological: No headache, diplopia, change in muscle strength, numbness or tingling. No change in gait, balance, coordination, mood, affect, memory, mentation, behavior. · Psychiatric: No anxiety, or depression. · Endocrine: No temperature intolerance. No excessive thirst, fluid intake, or urination. No tremor. · Hematologic/Lymphatic: No abnormal bruising or bleeding, blood clots or swollen lymph nodes. · Allergic/Immunologic: No nasal congestion or hives. PHYSICAL EXAM:    Physical Examination:    BP 93/63   Pulse 89   Temp 97.7 °F (36.5 °C) (Oral)   Resp 20   Ht 5' 5\" (1.651 m)   Wt 98 lb (44.5 kg)   SpO2 90%   BMI 16.31 kg/m²    Constitutional and General Appearance: alert, cooperative, no distress and appears stated age  HEENT: PERRL, no cervical lymphadenopathy. No masses palpable. Normal oral mucosa  Respiratory:  · Normal excursion and expansion without use of accessory muscles  · Resp Auscultation: Good respiratory effort. No for increased work of breathing. On auscultation: Clear to auscultation. Cardiovascular:  · The apical impulse is not displaced  · Heart tones are crisp and normal. regular S1 and S2. Murmurs: None.   · Jugular venous pulsation Normal  · The carotid upstroke is normal in amplitude and contour without delay or bruit  · Peripheral pulses are symmetrical and full   Abdomen:  · No masses or tenderness  · Bowel sounds present  Extremities:  ·  No Cyanosis or Clubbing  ·  Lower extremity edema: No.  ·  Skin: Warm and dry  Neurological:  · Alert and oriented. · Moves all extremities well  · No abnormalities of mood, affect, memory, mentation, or behavior are noted    DATA:    Diagnostics:      EKG: Sinus tachycardia, LVH by voltage, inferior Q waves and poor R wave progression in the anterior leads. Labs:     CBC:   Recent Labs     06/23/22  1035 06/24/22  0551   WBC 6.8 8.1   HGB 13.7 12.1   HCT 42.0 36.3    154     BMP:   Recent Labs     06/23/22  1035 06/24/22  0551    140   K 3.6* 4.5   CO2 26 25   BUN 13 11   CREATININE 0.46* 0.61   LABGLOM >60 >60   GLUCOSE 105* 206*     PT/INR:   Recent Labs     06/23/22  1035   PROTIME 12.8   INR 1.0     LIVER PROFILE:  Recent Labs     06/23/22  1035   AST 16   ALT 10   LABALBU 4.3       Patient Active Problem List   Diagnosis    Closed left hip fracture, initial encounter ([facility] [address]       IMPRESSION:      1. Abnormal EKG as above  2. Mechanical fall and hip fracture  3. History of hypertension untreated      RECOMMENDATIONS:    1. We will obtain 2D echocardiogram to rule out any underlying structural heart disease in view of abnormal EKG and history of hypertension. 2. Currently blood pressure is controlled without antihypertensive medication  3. If echo is unremarkable can be discharged with cardiology follow-up as outpatient    Discussed with patient and nursing.     Electronically signed by Elizabeth Cesar MD on 6/24/2022 at 10:54 [facility]  [phone]

## 2022-06-25 VITALS
OXYGEN SATURATION: 92 % | HEIGHT: 65 IN | SYSTOLIC BLOOD PRESSURE: 135 MMHG | DIASTOLIC BLOOD PRESSURE: 85 MMHG | RESPIRATION RATE: 16 BRPM | WEIGHT: 98 LBS | TEMPERATURE: 98 F | HEART RATE: 82 BPM | BODY MASS INDEX: 16.33 KG/M2

## 2022-06-25 LAB
HCT VFR BLD CALC: 31 % (ref 36–46)
HEMOGLOBIN: 10.2 G/DL (ref 12–16)
MCH RBC QN AUTO: 30.8 PG (ref 26–34)
MCHC RBC AUTO-ENTMCNC: 32.8 G/DL (ref 31–37)
MCV RBC AUTO: 93.8 FL (ref 80–100)
PDW BLD-RTO: 16 % (ref 11.5–14.9)
PLATELET # BLD: 135 K/UL (ref 150–450)
PMV BLD AUTO: 8.8 FL (ref 6–12)
RBC # BLD: 3.3 M/UL (ref 4–5.2)
WBC # BLD: 8.3 K/UL (ref 3.5–11)

## 2022-06-25 PROCEDURE — 36415 COLL VENOUS BLD VENIPUNCTURE: CPT

## 2022-06-25 PROCEDURE — 99024 POSTOP FOLLOW-UP VISIT: CPT | Performed by: ORTHOPAEDIC SURGERY

## 2022-06-25 PROCEDURE — 6360000002 HC RX W HCPCS: Performed by: ORTHOPAEDIC SURGERY

## 2022-06-25 PROCEDURE — 6370000000 HC RX 637 (ALT 250 FOR IP): Performed by: ORTHOPAEDIC SURGERY

## 2022-06-25 PROCEDURE — 85027 COMPLETE CBC AUTOMATED: CPT

## 2022-06-25 PROCEDURE — 97116 GAIT TRAINING THERAPY: CPT

## 2022-06-25 PROCEDURE — 99231 SBSQ HOSP IP/OBS SF/LOW 25: CPT | Performed by: INTERNAL MEDICINE

## 2022-06-25 RX ORDER — HYDROCODONE BITARTRATE AND ACETAMINOPHEN 5; 325 MG/1; MG/1
1 TABLET ORAL EVERY 6 HOURS PRN
Qty: 28 TABLET | Refills: 0 | Status: SHIPPED | OUTPATIENT
Start: 2022-06-25 | End: 2022-07-02

## 2022-06-25 RX ORDER — ASPIRIN 81 MG/1
81 TABLET ORAL DAILY
Qty: 90 TABLET | Refills: 0 | Status: SHIPPED | OUTPATIENT
Start: 2022-06-25

## 2022-06-25 RX ADMIN — ENOXAPARIN SODIUM 30 MG: 30 INJECTION SUBCUTANEOUS at 09:17

## 2022-06-25 RX ADMIN — HYDROCODONE BITARTRATE AND ACETAMINOPHEN 2 TABLET: 5; 325 TABLET ORAL at 16:32

## 2022-06-25 RX ADMIN — HYDROCODONE BITARTRATE AND ACETAMINOPHEN 2 TABLET: 5; 325 TABLET ORAL at 05:56

## 2022-06-25 ASSESSMENT — PAIN SCALES - GENERAL
PAINLEVEL_OUTOF10: 10
PAINLEVEL_OUTOF10: 6

## 2022-06-25 ASSESSMENT — PAIN DESCRIPTION - LOCATION: LOCATION: HIP

## 2022-06-25 NOTE — PROGRESS NOTES
[facility]    CONSULTATION / HISTORY AND PHYSICAL EXAMINATION            Date:   6/25/2022  Patient name:  Vincent Chang  Date of admission:  6/23/2022 10:07 AM  MRN:   [id number]  Account:  [de-identified]  YOB: 1950  PCP:    No primary care provider on file. Room:   2058/2058-01  Code Status:    Full Code    Physician Requesting Consult: Darya Da Silva MD    Reason for Consult:  Medical management    Chief Complaint:     Chief Complaint   Patient presents with   Gigi Watkins    Hip Pain     left        History Obtained From:     Patient, EMR, nursing staff    History of Present Illness:   77-year-old presenting with fall which happened 1 hour prior to presentation  Patient tripped on the grocery cart and landed on concrete  Stephanie Ryan on left hip, fracture noted on x-rays in ER    Medical history includes hypertension-she is not taking any medication for this  HTN  Onset more than 5 years ago  Vivian: mild to mod  Not associated with headaches or blurry vision  No chest pain        Past Medical History:     Past Medical History:   Diagnosis Date    Hypertension         Past Surgical History:     Past Surgical History:   Procedure Laterality Date    FEMUR FRACTURE SURGERY Left 6/23/2022    HIP TFN performed by Darya Da Silva MD at [address]        Medications Prior to Admission:     Prior to Admission medications    Medication Sig Start Date End Date Taking? Authorizing Provider   HYDROcodone-acetaminophen (NORCO) 5-325 MG per tablet Take 1 tablet by mouth every 6 hours as needed for Pain for up to 7 days. Intended supply: 7 days.  Take lowest dose possible to manage pain 6/25/22 7/2/22 Yes Darya Da Silva MD   aspirin EC 81 MG EC tablet Take 1 tablet by mouth daily 6/25/22  Yes Darya Da Silva MD   Aspirin-Acetaminophen-Caffeine (EXCEDRIN PO) Take by mouth as needed   Yes Historical Provider, MD        Allergies:     Pcn [penicillins]    Social History:     Tobacco: reports that she has been smoking cigarettes. She does not have any smokeless tobacco history on file. Alcohol:      reports no history of alcohol use. Drug Use:  reports no history of drug use. Family History:     History reviewed. No pertinent family history. Review of Systems:     Positive and Negative as described in HPI. CONSTITUTIONAL:  negative for fevers, chills, sweats, fatigue, weight loss  HEENT:  negative for vision, hearing changes, runny nose, throat pain  RESPIRATORY:  negative for shortness of breath, cough, congestion, wheezing. CARDIOVASCULAR:  negative for chest pain, palpitations. GASTROINTESTINAL:  negative for nausea, vomiting, diarrhea, constipation, change in bowel habits, abdominal pain   GENITOURINARY:  negative for difficulty of urination, burning with urination, frequency   INTEGUMENT:  negative for rash, skin lesions, easy bruising   HEMATOLOGIC/LYMPHATIC:  negative for swelling/edema   ALLERGIC/IMMUNOLOGIC:  negative for urticaria , itching  ENDOCRINE:  negative increase in drinking, increase in urination, hot or cold intolerance  MUSCULOSKELETAL: Left hip pain, negative joint pains, muscle aches, swelling of joints  NEUROLOGICAL:  negative for headaches, dizziness, lightheadedness, numbness, pain, tingling extremities      Physical Exam:     BP (!) 112/97   Pulse 97   Temp 97.9 °F (36.6 °C) (Oral)   Resp 18   Ht 5' 5\" (1.651 m)   Wt 98 lb (44.5 kg)   SpO2 94%   BMI 16.31 kg/m²   Temp (24hrs), Av.9 °F (36.6 °C), Min:97.5 °F (36.4 °C), Max:98.1 °F (36.7 °C)    No results for input(s): POCGLU in the last 72 hours. Intake/Output Summary (Last 24 hours) at 2022 1208  Last data filed at 2022 1927  Gross per 24 hour   Intake 47.06 ml   Output --   Net 47.06 ml       General Appearance:  alert, well appearing, and in no acute distress  Head:  normocephalic, atraumatic.   Eye: no icterus, redness, pupils equal and reactive, extraocular eye movements intact, conjunctiva clear  Ear: normal external ear, no discharge, hearing intact  Nose:  no drainage noted  Mouth: mucous membranes moist  Neck: supple, no carotid bruits, thyroid not palpable  Lungs: Bilateral equal air entry, clear to ausculation, no wheezing, rales or rhonchi, normal effort  Cardiovascular: normal rate, regular rhythm, no murmur, gallop, rub. Abdomen: Soft, nontender, nondistended, normal bowel sounds, no hepatomegaly or splenomegaly  Neurologic: There are no new focal motor or sensory deficits, normal muscle tone and bulk, no abnormal sensation, normal speech, cranial nerves II through XII grossly intact  Skin: No gross lesions, rashes, bruising or bleeding on exposed skin area  Extremities: Left groin pain, peripheral pulses palpable, no pedal edema or calf pain with palpation  Psych: normal affect    Investigations:      Laboratory Testing:  Recent Results (from the past 24 hour(s))   CBC    Collection Time: 06/25/22  8:26 AM   Result Value Ref Range    WBC 8.3 3.5 - 11.0 k/uL    RBC 3.30 (L) 4.0 - 5.2 m/uL    Hemoglobin 10.2 (L) 12.0 - 16.0 g/dL    Hematocrit 31.0 (L) 36 - 46 %    MCV 93.8 80 - 100 fL    MCH 30.8 26 - 34 pg    MCHC 32.8 31 - 37 g/dL    RDW 16.0 (H) 11.5 - 14.9 %    Platelets 674 (L) 568 - 450 k/uL    MPV 8.8 6.0 - 12.0 fL       Imaging/Diagonstics:  Recent data reviewed    Assessment :      Primary Problem  Closed left hip fracture, initial encounter [facility])    Active Hospital Problems    Diagnosis Date Noted    Closed left hip fracture, initial encounter ([facility] Garrett Lawrence 06/23/2022     Priority: Medium       Plan:     1. Left hip #-plan for surgery today  2. Hypertension- patient denies being on any medication for hypertension prior to admission, elevated blood pressure likely due to pain-will review after pain medication  3.  Malnutrition BMI 16    DVT prophylaxis-mechanical prior to surgery    6/24  POD #1 status post intramedullary fixation of left hip fracture  Abnormal EKG noted this morning, cardiology was consulted  Echo has been ordered and pending  Blood pressure controlled  Has passedurine since procedure, has had a BM    6/25  POD #2, doing well Echo unremarkable  Ready for discharge from medical standpoint    Consultations:   Nanette Quiroga MD  6/25/2022  12:08 PM    Copy sent to Dr. Mabel Aragon primary care provider on file. Please note that this chart was generated using voice recognition Dragon dictation software. Although every effort was made to ensure the accuracy of this automated transcription, some errors in transcription may have occurred.

## 2022-06-25 NOTE — PROGRESS NOTES
[facility]  In Patient Progress             Date:   6/25/2022  Patient name: Zahra Melchor  Date of admission:  6/23/2022 10:07 AM  MRN:   [id number]  YOB: 1950      Date:   6/25/2022  Patient name: Zahra Melchor  Date of admission:  6/23/2022 10:07 AM  MRN:   [id number]  YOB: 1950    Reason for Admission: Fall and hip fracture    CHIEF COMPLAINT: Hip pain    Subjective- no cp, sob. S/p Echo as below.          Current Facility-Administered Medications:     sodium chloride flush 0.9 % injection 5-40 mL, 5-40 mL, IntraVENous, 2 times per day, Jean Pierre Lozano MD    sodium chloride flush 0.9 % injection 5-40 mL, 5-40 mL, IntraVENous, PRN, Jean Pierre Lozano MD    0.9 % sodium chloride infusion, , IntraVENous, PRN, Jean Pierre Lozano MD    acetaminophen (TYLENOL) tablet 650 mg, 650 mg, Oral, Q4H PRN, Jean Pierre Lozano MD    ondansetron (ZOFRAN-ODT) disintegrating tablet 4 mg, 4 mg, Oral, Q8H PRN **OR** ondansetron (ZOFRAN) injection 4 mg, 4 mg, IntraVENous, Q6H PRN, Jean Pierre Lozano MD    potassium chloride (KLOR-CON M) extended release tablet 40 mEq, 40 mEq, Oral, PRN **OR** potassium bicarb-citric acid (EFFER-K) effervescent tablet 40 mEq, 40 mEq, Oral, PRN **OR** potassium chloride 10 mEq/100 mL IVPB (Peripheral Line), 10 mEq, IntraVENous, PRN, Jean Pierre Lozano MD    hydrALAZINE (APRESOLINE) injection 10 mg, 10 mg, IntraVENous, Q6H PRN, Jean Pierre Lozano MD, 10 mg at 06/23/22 1701    enoxaparin Sodium (LOVENOX) injection 30 mg, 30 mg, SubCUTAneous, Daily, Jean Pierre Lozano MD, 30 mg at 06/24/22 0841    sodium chloride flush 0.9 % injection 5-40 mL, 5-40 mL, IntraVENous, 2 times per day, Jean Pierre Lozano MD    sodium chloride flush 0.9 % injection 5-40 mL, 5-40 mL, IntraVENous, PRN, Jean Pierre Lozano MD    0.9 % sodium chloride infusion, , IntraVENous, PRN, Jean Pierre Lozano MD    HYDROcodone-acetaminophen (NORCO) 5-325 MG per tablet 1 tablet, 1 tablet, Oral, Q4H PRN **OR** HYDROcodone-acetaminophen (NORCO) 5-325 MG per tablet 2 tablet, 2 tablet, Oral, Q4H PRN, Preethi Walter MD, 2 tablet at 06/25/22 0556    Allergies   Allergen Reactions    Pcn [Penicillins] Swelling     When she was little, had swelling requiring epinephrine. PHYSICAL EXAM:    Physical Examination:    BP (!) 112/97   Pulse 97   Temp 97.9 °F (36.6 °C) (Oral)   Resp 18   Ht 5' 5\" (1.651 m)   Wt 98 lb (44.5 kg)   SpO2 94%   BMI 16.31 kg/m²    Constitutional and General Appearance: alert, cooperative, no distress and appears stated age  HEENT: PERRL, no cervical lymphadenopathy. No masses palpable. Normal oral mucosa  Respiratory:  · Normal excursion and expansion without use of accessory muscles  · Resp Auscultation: Good respiratory effort. No for increased work of breathing. On auscultation: Clear to auscultation. Cardiovascular:  · The apical impulse is not displaced  · Heart tones are crisp and normal. regular S1 and S2. Murmurs: None. · Jugular venous pulsation Normal  · The carotid upstroke is normal in amplitude and contour without delay or bruit  · Peripheral pulses are symmetrical and full   Abdomen:  · No masses or tenderness  · Bowel sounds present  Extremities:  ·  No Cyanosis or Clubbing  ·  Lower extremity edema: No.  ·  Skin: Warm and dry  Neurological:  · Alert and oriented. · Moves all extremities well  · No abnormalities of mood, affect, memory, mentation, or behavior are noted    DATA:    Diagnostics:      EKG: Sinus tachycardia, LVH by voltage, inferior Q waves and poor R wave progression in the anterior leads.       Labs:     CBC:   Recent Labs     06/24/22  0551 06/25/22  0826   WBC 8.1 8.3   HGB 12.1 10.2*   HCT 36.3 31.0*    135*     BMP:   Recent Labs     06/23/22  1035 06/24/22  0551    140   K 3.6* 4.5   CO2 26 25   BUN 13 11   CREATININE 0.46* 0.61   LABGLOM >60 >60   GLUCOSE 105* 206*     PT/INR:   Recent Labs     06/23/22  1035   PROTIME 12.8   INR 1.0     LIVER PROFILE:  Recent Labs 06/23/22  1035   AST 16   ALT 10   LABALBU 4.3       Echo 6/24/22:  Summary  Left ventricle is normal in size and wall thickness. Global left ventricular systolic function is normal. Estimated LV EF 65-70  %. Mild tricuspid regurgitation. No pulmonary hypertension. Estimated right ventricular systolic pressure is  [id number]. Aortic root is mildly dilated @4.1cm. IMPRESSION:      1. Abnormal EKG as above  2. Mechanical fall and hip fracture  3. History of hypertension untreated  4. Low risk Echo on 6/24/22-EF 65 to 70%, mild TR, dilated aortic root at 4.1 cm      RECOMMENDATIONS:    1. Currently blood pressure is controlled without antihypertensive medication  2.  cardiology follow-up as outpatient in 2 weeks. Discussed with patient and nursing.     Electronically signed by Francisco J Rubin DO on 6/25/2022 at 9:04 [facility]  [phone]

## 2022-06-25 NOTE — PLAN OF CARE
Problem: Pain  Goal: Verbalizes/displays adequate comfort level or baseline comfort level  6/25/2022 0329 by Stephany Francisco RN  Outcome: Progressing  Note: Pt able to verbalize comfort     Problem: Safety - Adult  Goal: Free from fall injury  6/25/2022 0329 by Stephany Francisco RN  Outcome: Progressing  Note: Pt free from falls     Problem: Skin/Tissue Integrity  Goal: Absence of new skin breakdown  Description: 1. Monitor for areas of redness and/or skin breakdown  2. Assess vascular access sites hourly  3. Every 4-6 hours minimum:  Change oxygen saturation probe site  4. Every 4-6 hours:  If on nasal continuous positive airway pressure, respiratory therapy assess nares and determine need for appliance change or resting period.   Outcome: Progressing  Note: Pt absent of any new skin breakdown     Problem: ABCDS Injury Assessment  Goal: Absence of physical injury  6/25/2022 0329 by Stephany Francisco RN  Outcome: Progressing  Note: Pt absent of any physical injury

## 2022-06-25 NOTE — DISCHARGE SUMMARY
Discharge Summary    Attending Physician: Arabella Castro MD  Admit Date: 6/23/2022  Discharge Date:    Primary Care Physician: No primary care provider on file. Admitting Diagnosis:  Principal Problem:    Closed left hip fracture, initial encounter ([address]  Resolved Problems:    * No resolved hospital problems. *        Discharge Diagnoses:  Principal Problem:    Closed left hip fracture, initial encounter [facility])  Resolved Problems:    * No resolved hospital problems. *         Past Medical History:   Diagnosis Date    Hypertension        Procedures Performed and Findings  Procedure(s) with comments:  HIP TFN -   SYNTHES     Consultations Obtained  IP CONSULT TO ORTHOPEDIC SURGERY  IP CONSULT TO INTERNAL MEDICINE  IP CONSULT TO SOCIAL WORK  IP CONSULT  [address]  uncomplicated    Discharge Medications       Medication List      ASK your doctor about these medications    aspirin 81 MG EC tablet     EXCEDRIN PO             Discharge Condition  Stable       Activity on Discharge  As tolerated       Discharge Disposition:  Home    Discharge Instructions  See Orders    Follow-Up Scheduled    No follow-up provider specified.     Electronically signed by Arabella Castro MD on 6/25/2022 at 10:03 AM

## 2022-06-25 NOTE — PLAN OF CARE
Problem: Pain  Goal: Verbalizes/displays adequate comfort level or baseline comfort level  Outcome: Adequate for Discharge, Pt educated on non-pharmacological pain interventions. PRN pain medications administered.

## 2022-06-25 NOTE — PROGRESS NOTES
Follow up appts. , discharge instructions and medications reviewed with patient. Understanding stated, denies questions or concerns. Pt currently waiting on her daughter to arrive to be discharged home.

## 2022-06-25 NOTE — PROGRESS NOTES
RN notified KRISTI Tejeda of pts IV infiltrating and pt not wanting another one started because she is leaving today. KRISTI Tejeda did not give the 'ok' for the patient to be without the IV.

## 2022-06-25 NOTE — PLAN OF CARE
Problem: Pain  Goal: Verbalizes/displays adequate comfort level or baseline comfort level  Outcome: Adequate for Discharge, Pt educated on non-pharmacological pain interventions. PRN pain medications administered. Problem: Safety - Adult  Goal: Free from fall injury  Outcome: Adequate for Discharge, Patient remains free of incidence/ injury. Bed remains in low position. Side rails up x2. Problem: Skin/Tissue Integrity  Goal: Absence of new skin breakdown  Description: 1. Monitor for areas of redness and/or skin breakdown  2. Assess vascular access sites hourly  3. Every 4-6 hours minimum:  Change oxygen saturation probe site  4. Every 4-6 hours:  If on nasal continuous positive airway pressure, respiratory therapy assess nares and determine need for appliance change or resting period. Outcome: Adequate for Discharge, Pt remain free of skin breakdown. Educated on the importance of turning and alternating position.

## 2022-06-25 NOTE — CARE COORDINATION
Raymond Ascencio U. 12. Encounter Date/Time: 2022 Reina Account: [de-identified]    MRN: [id number]    Patient: [facility]    Contact Serial #: [id number]      ENCOUNTER          Patient Class: I Private Enc? No Unit RM BD: [place] 71    Hospital Service: MED   Encounter DX: Closed left hip fracture*   ADM Provider: Raul Thibodeaux MD   Procedure:     ATT Provider: Raul Thibodeaux MD   REF Provider:        Admission DX: Closed left hip fracture, initial encounter [facility]) and DX codes: Q32.306G      PATIENT                 Name: [facility] : 1950 (72 yrs)   Address: [facility] Sex: Female   [place]: [facility] [address]         Marital Status:    Employer: DISABLED         Latter-day: None   Primary Care Provider:           Primary Phone: [address]   Contact Name Legal Guardian? Relationship to Patient Home Phone Work Phone   1. Dion   2. Sorin Sahu      Child  Child [phone]              GUARANTOR            Guarantor: [facility]     : 1950   Address: [facility] Sex: Female   Ronen Levine [address]     Relation to Patient: Self       Home Phone: [phone]   Guarantor ID: [id number]       Work Phone:     Guarantor Employer: DISABLED         Status: NOT EMPLO*      COVERAGE        PRIMARY INSURANCE   Payor: [facility] MEDICARE Plan: [facility] MEDICARE COMPLETE   Payor Address: ,          Group Number: [id number] Insurance Type: [place] [id number] Name: Luis Calvillo : 1950   Subscriber ID: [id number] Pat. Rel. to Sub: Self   SECONDARY INSURANCE   Payor:   Plan:     Payor Address:  ,           Group Number:   Insurance Type:     Subscriber Name:   Subscriber :     Subscriber ID:   Pat.  Rel. to Sub:             CSN: [id number]          [id number]        CSN                                    Req/Control # Excell Jermaine retrieving Specimen ID]                                   Order Date:  2022  [id number]     Patient Information      Name:  Saroj Chiang  :  1950  Age:  74 y.o. Address:  [address] [place] [address]   Zip:  [address]  PCP: No primary care provider on file. Sex:  F  SSN: [id number]  Home Phone: [phone]  Work Phone:    Patient MRN:  [id number]    Alt Patient ID:  [id number]  PCP Phone: None       Authorizing Provider Information       AUTHORIZING PROVIDER: Ej Rodrigues MD  Physician ID: [id number]  NPI:  [id number]  Site:   Address: [place] [address], [address]  [address] [place] [address] [address] [place] [address] Zip: [address] [place] [address], [address] [place] [address]  Phone: [phone]  Fax: [phone]             EQUIPMENT ORDERED  DME Order for Prudence Pounds (ORD   #:   [id number]) Priority  Routine Class  Hospital Performed        Associated Diagnosis:          Comments:    You must complete the order parameters below and add the medical necessity documentation for this DME in a separate note.     Folding Walker with Wheels     Current patient weight: Weight: 98 lb (44.5 kg)  Current patient height: Height: 5' 5\" (165.1 cm)  Diagnosis: S/P Lt Hip TFN  Duration: Purchase            Scheduling Instructions:                                 Specimen Source             Collection Date    Collection Time    Order Status    Expected Date                 Electronically Signed By  Ej Rodrigues MD  NPI:  [id number] Date  2022  9:58 AM         Responsible Party Carlos Salazar     Guar-ActID   Relationship Account Type Home Phone   Zeferino Limal - [phone] [facility], [address] Self P/F [phone]   Employer   Work Phone   DISABLED   [employer]aver[employer]     Primary Insurance  Insurance/Subscriber ID:  [id number]  [address] Name:  Zeferino Kenyon              Relationship to Patient: SelfSigned ABN: N    Payor Name:  [address]:  [place]   Group:   Worker's Comp Date of Injury:

## 2022-06-25 NOTE — CARE COORDINATION
ONGOING DISCHARGE PLAN:    Patient is alert and oriented x4. Spoke with patient regarding discharge plan and patient confirms that plan is still to return to home. Pt. Is POD #1, Lt. Hip TFN. Denies VNS. Needs Jaime Reed, [address] faxed DME orders/Face Sheet, to [facility] [place], Informed, Devin Sampson, to have  deliver to the room. Pt. States \"Daughter, will be picking her up to take her Home\". Denies other needs. Will continue to follow for additional discharge needs.     Electronically signed by Aretha Hansen RN on 6/25/2022 at 9:56 AM

## 2022-06-25 NOTE — PROGRESS NOTES
Facility/Department: [facility] MED SURG  Daily Treatment Note  NAME: Guillermo Christensen  : 1950  MRN: [id number]    Date of Service: 2022    Discharge Recommendations:  Patient able to tolerate 3hrs of therapy a day,Patient would benefit from continued therapy after discharge   PT Equipment Recommendations  Equipment Needed: Yes  Melanie Matter: Rolling    Patient Diagnosis(es): The encounter diagnosis was Closed left hip fracture, initial encounter ([facility] [address] Assessment   Activity Tolerance: Patient tolerated treatment well  Equipment Needed: Yes     Plan    Plan  Plan: 1 time a day 7 days a week  Current Treatment Recommendations: Functional mobility training;Transfer training;Gait training; Safety education & training     Restrictions  Restrictions/Precautions  Restrictions/Precautions: Weight Bearing,Fall Risk,General Precautions,Up as Tolerated  Required Braces or Orthoses?: No  Implants present? : Metal implants (LLE TFN)  Lower Extremity Weight Bearing Restrictions  Left Lower Extremity Weight Bearing: Weight Bearing As Tolerated     Subjective    Subjective  Subjective: pt reports returning to daughters home enviroment with assist as needed , refuses step training , ambulation > room distance, HEP , home therapy or out pt therapy , DR Nannette Tamayo and manpreet notified, case manage notified with request for RW with return to home enviroment this date   Orientation  Overall Orientation Status: Within Functional Limits     Objective   Vitals     Bed Mobility Training  Bed Mobility Training: Yes  Overall Level of Assistance: Stand-by assistance;Minimum assistance  Interventions: Verbal cues  Supine to Sit: Stand-by assistance  Sit to Supine: Minimum assistance (left LE bed flat no handrail , pt educted level of assist )  Scooting: Stand-by assistance  Transfer Training  Transfer Training: Yes  Overall Level of Assistance: Stand-by assistance  Interventions: Verbal cues  Sit to Stand: Stand-by assistance  Stand to Sit: Stand-by assistance (instruction hand placement )  Stand Pivot Transfers: Stand-by assistance (RW)  Gait Training  Gait Training: Yes  Left Side Weight Bearing: As tolerated  Gait  Overall Level of Assistance: Stand-by assistance  Interventions: Verbal cues (UE weight bearing as needed for WBAT , increased right step )  Speed/Sherrell: Slow  Step Length: Right shortened  Stance: Left decreased (with UE weight bearing )  Gait Abnormalities: Step to gait  Distance (ft): 25 Feet  Assistive Device: Walker, rolling        Other Specialty Interventions  Other Treatments/Modalities: pt educated benefits removal of throw rugs and obstacles with use of RW in  home enviroment           Goals  Short Term Goals  Time Frame for Short term goals: 2-3 days  Short term goal 1: bed mobility with Susan  Short term goal 2: transfers with SBA for safe ADLs  Short term goal 3: ambulate with RW x 40-45ft with SBA    Education  Patient Education  Education Given To: Patient  Education Provided: Equipment;Transfer Training  Education Provided Comments: instructed in use of RW  Education Method: Demonstration;Verbal  Barriers to Learning: None  Education Outcome: Demonstrated understanding    Therapy Time   Individual Concurrent Group Co-treatment   Time In (P) 0926         Time Out (P) 0942         Minutes (P) 16                 Daysi Fragoso, PTA

## 2022-06-26 NOTE — PROGRESS NOTES
Pt daughter arrived to transport home. Belongings left with patient. PCT transported pt via wheelchair to lobby as daughter is waiting outside.

## 2022-07-12 ENCOUNTER — OFFICE VISIT (OUTPATIENT)
Dept: ORTHOPEDIC SURGERY | Age: 72
End: 2022-07-12

## 2022-07-12 VITALS — WEIGHT: 98 LBS | BODY MASS INDEX: 16.33 KG/M2 | RESPIRATION RATE: 14 BRPM | HEIGHT: 65 IN

## 2022-07-12 DIAGNOSIS — S72.002A CLOSED LEFT HIP FRACTURE, INITIAL ENCOUNTER (HCC): Primary | ICD-10-CM

## 2022-07-12 PROCEDURE — 99024 POSTOP FOLLOW-UP VISIT: CPT | Performed by: ORTHOPAEDIC SURGERY

## 2022-07-12 NOTE — PROGRESS NOTES
Patient ID: Collin Slater is a 74 y.o. female    Chief Compliant:  Chief Complaint   Patient presents with    Post-Op Check        Diagnostic imaging:    AP lateral left hip status post intramedullary fixation intertrochanteric hip fracture stable    Assessment and Plan:  1. Closed left hip fracture, initial encounter ([facility] [address]      3 weeks post left TFN recovering well    Follow up 4 w    HPI:  This is a 74 y.o. female who presents to the clinic today for 3 weeks post left hip TFN 6/23/22. Patient is recovering well post operatively. She is ambulating via cane    Review of Systems   All other systems reviewed and are negative. Past History:    Current Outpatient Medications:     aspirin EC 81 MG EC tablet, Take 1 tablet by mouth daily, Disp: 90 tablet, Rfl: 0    Aspirin-Acetaminophen-Caffeine (EXCEDRIN PO), Take by mouth as needed, Disp: , Rfl:   Allergies   Allergen Reactions    Pcn [Penicillins] Swelling     When she was little, had swelling requiring epinephrine. Social History     Socioeconomic History    Marital status:      Spouse name: Not on file    Number of children: Not on file    Years of education: Not on file    Highest education level: Not on file   Occupational History    Not on file   Tobacco Use    Smoking status: Current Every Day Smoker     Types: Cigarettes    Smokeless tobacco: Never Used   Substance and Sexual Activity    Alcohol use: Never    Drug use: Never    Sexual activity: Never   Other Topics Concern    Not on file   Social History Narrative    Not on file     Social Determinants of Health     Financial Resource Strain:     Difficulty of Paying Living Expenses: Not on file   Food Insecurity:     Worried About Running Out of Food in the Last Year: Not on file    Matt of Food in the Last Year: Not on file   Transportation Needs:     Lack of Transportation (Medical): Not on file    Lack of Transportation (Non-Medical):  Not on file   Physical Activity:     Days of Exercise per Week: Not on file    Minutes of Exercise per Session: Not on file   Stress:     Feeling of Stress : Not on file   Social Connections:     Frequency of Communication with Friends and Family: Not on file    Frequency of Social Gatherings with Friends and Family: Not on file    Attends Scientology Services: Not on file    Active Member of [place]  Group or Organizations: Not on file    Attends Club or Organization Meetings: Not on file    Marital Status: Not on file   Intimate Partner Violence:     Fear of Current or Ex-Partner: Not on file    Emotionally Abused: Not on file    Physically Abused: Not on file    Sexually Abused: Not on file   Housing Stability:     Unable to Pay for Housing in the Last Year: Not on file    Number of [place] in the Last Year: Not on file    Unstable Housing in the Last Year: Not on file     Past Medical History:   Diagnosis Date    Hypertension      Past Surgical History:   Procedure Laterality Date    FEMUR FRACTURE SURGERY Left 6/23/2022    HIP TFN performed by Cortney Cerrato MD at [address] [place] [address]     No family history on file. Physical Exam:  Vitals signs and nursing note reviewed. Constitutional:       Appearance: well-developed. HENT:      Head: Normocephalic and atraumatic. Nose: Nose normal.   Eyes:      Conjunctiva/sclera: Conjunctivae normal.   Neck:      Musculoskeletal: Normal range of motion and neck supple. Pulmonary:      Effort: Pulmonary effort is normal. No respiratory distress. Musculoskeletal:      Comments: Normal gait     Skin:     General: Skin is warm and dry. Neurological:      Mental Status: Alert and oriented to person, place, and time. Sensory: No sensory deficit. Psychiatric:         Behavior: Behavior normal.         Thought Content: Thought content normal.    [place] discontinued Incision is healing well. No signs of infection    Provider Attestation:  Ingris Carlos, personally performed the services described in this documentation. All medical record entries made by the scribe were at my direction and in my presence. I have reviewed the chart and discharge instructions and agree that the records reflect my personal performance and is accurate and complete. Tyesha Sanchez MD 7/12/22       Scribe Attestation:  By signing my name below, Odilia Hassan, attest that this documentation has been prepared under the direction and in the presence of Dr. Mahendra Sneed. Electronically signed: Yaz Pang, 7/12/22     Please note that this chart was generated using voice recognition Dragon dictation software. Although every effort was made to ensure the accuracy of this automated transcription, some errors in transcription may have occurred.

## 2023-11-29 ENCOUNTER — HOSPITAL ENCOUNTER (INPATIENT)
Age: 73
LOS: 5 days | Discharge: HOME | DRG: 064 | End: 2023-12-04
Attending: EMERGENCY MEDICINE | Admitting: PSYCHIATRY & NEUROLOGY
Payer: MEDICARE

## 2023-11-29 ENCOUNTER — APPOINTMENT (OUTPATIENT)
Dept: CT IMAGING | Age: 73
DRG: 064 | End: 2023-11-29
Payer: MEDICARE

## 2023-11-29 ENCOUNTER — HOSPITAL ENCOUNTER (EMERGENCY)
Age: 73
Discharge: ANOTHER HOSPITAL | DRG: 064 | End: 2023-11-29
Attending: EMERGENCY MEDICINE
Payer: MEDICARE

## 2023-11-29 ENCOUNTER — APPOINTMENT (OUTPATIENT)
Dept: GENERAL RADIOLOGY | Age: 73
DRG: 064 | End: 2023-11-29
Payer: MEDICARE

## 2023-11-29 ENCOUNTER — APPOINTMENT (OUTPATIENT)
Dept: MRI IMAGING | Age: 73
DRG: 064 | End: 2023-11-29
Payer: MEDICARE

## 2023-11-29 VITALS
OXYGEN SATURATION: 95 % | RESPIRATION RATE: 27 BRPM | HEIGHT: 64 IN | WEIGHT: 98 LBS | TEMPERATURE: 98 F | DIASTOLIC BLOOD PRESSURE: 104 MMHG | HEART RATE: 75 BPM | SYSTOLIC BLOOD PRESSURE: 154 MMHG | BODY MASS INDEX: 16.73 KG/M2

## 2023-11-29 DIAGNOSIS — R91.8 LUNG MASS: ICD-10-CM

## 2023-11-29 DIAGNOSIS — I48.20 ATRIAL FIBRILLATION, CHRONIC (HCC): ICD-10-CM

## 2023-11-29 DIAGNOSIS — I62.9 INTRACRANIAL HEMORRHAGE (HCC): Primary | ICD-10-CM

## 2023-11-29 DIAGNOSIS — I61.5 INTRAVENTRICULAR HEMORRHAGE (HCC): Primary | ICD-10-CM

## 2023-11-29 PROBLEM — I61.9 INTRAPARENCHYMAL HEMORRHAGE OF BRAIN (HCC): Status: ACTIVE | Noted: 2023-11-29

## 2023-11-29 LAB
ALBUMIN SERPL-MCNC: 4.3 G/DL (ref 3.5–5.2)
ALP SERPL-CCNC: 72 U/L (ref 35–104)
ALT SERPL-CCNC: 11 U/L (ref 5–33)
ANION GAP SERPL CALCULATED.3IONS-SCNC: 11 MMOL/L (ref 9–17)
APAP SERPL-MCNC: <5 UG/ML (ref 10–30)
AST SERPL-CCNC: 16 U/L
BASOPHILS # BLD: 0.1 K/UL (ref 0–0.2)
BASOPHILS NFR BLD: 1 % (ref 0–2)
BILIRUB SERPL-MCNC: 1.2 MG/DL (ref 0.3–1.2)
BUN SERPL-MCNC: 16 MG/DL (ref 8–23)
CALCIUM SERPL-MCNC: 8.9 MG/DL (ref 8.6–10.4)
CHLORIDE SERPL-SCNC: 101 MMOL/L (ref 98–107)
CO2 SERPL-SCNC: 26 MMOL/L (ref 20–31)
CREAT SERPL-MCNC: 0.6 MG/DL (ref 0.5–0.9)
EOSINOPHIL # BLD: 0.2 K/UL (ref 0–0.4)
EOSINOPHILS RELATIVE PERCENT: 2 % (ref 0–4)
ERYTHROCYTE [DISTWIDTH] IN BLOOD BY AUTOMATED COUNT: 14 % (ref 11.5–14.9)
ETHANOL PERCENT: <0.01 %
ETHANOLAMINE SERPL-MCNC: <10 MG/DL
GFR SERPL CREATININE-BSD FRML MDRD: >60 ML/MIN/1.73M2
GLUCOSE SERPL-MCNC: 88 MG/DL (ref 70–99)
HCT VFR BLD AUTO: 42.7 % (ref 36–46)
HGB BLD-MCNC: 14.2 G/DL (ref 12–16)
INR PPP: 1
LIPASE SERPL-CCNC: 24 U/L (ref 13–60)
LYMPHOCYTES NFR BLD: 0.8 K/UL (ref 1–4.8)
LYMPHOCYTES RELATIVE PERCENT: 10 % (ref 24–44)
MAGNESIUM SERPL-MCNC: 1.9 MG/DL (ref 1.6–2.6)
MCH RBC QN AUTO: 33.4 PG (ref 26–34)
MCHC RBC AUTO-ENTMCNC: 33.3 G/DL (ref 31–37)
MCV RBC AUTO: 100.6 FL (ref 80–100)
MONOCYTES NFR BLD: 0.5 K/UL (ref 0.1–1.3)
MONOCYTES NFR BLD: 6 % (ref 1–7)
NEUTROPHILS NFR BLD: 81 % (ref 36–66)
NEUTS SEG NFR BLD: 6.5 K/UL (ref 1.3–9.1)
PLATELET # BLD AUTO: 150 K/UL (ref 150–450)
PMV BLD AUTO: 9.1 FL (ref 6–12)
POTASSIUM SERPL-SCNC: 3.7 MMOL/L (ref 3.7–5.3)
PROT SERPL-MCNC: 6.6 G/DL (ref 6.4–8.3)
PROTHROMBIN TIME: 13.3 SEC (ref 11.8–14.6)
RBC # BLD AUTO: 4.25 M/UL (ref 4–5.2)
SALICYLATES SERPL-MCNC: 4.4 MG/DL (ref 3–10)
SODIUM SERPL-SCNC: 138 MMOL/L (ref 135–144)
T4 FREE SERPL-MCNC: 1.1 NG/DL (ref 0.9–1.7)
TOXIC TRICYCLIC SC,BLOOD: NEGATIVE
TROPONIN I SERPL HS-MCNC: 12 NG/L (ref 0–14)
TSH SERPL DL<=0.05 MIU/L-ACNC: 0.56 UIU/ML (ref 0.3–5)
TSH SERPL DL<=0.05 MIU/L-ACNC: 0.59 UIU/ML (ref 0.3–5)
WBC OTHER # BLD: 8 K/UL (ref 3.5–11)

## 2023-11-29 PROCEDURE — APPNB45 APP NON BILLABLE 31-45 MINUTES: Performed by: NURSE PRACTITIONER

## 2023-11-29 PROCEDURE — 93005 ELECTROCARDIOGRAM TRACING: CPT | Performed by: EMERGENCY MEDICINE

## 2023-11-29 PROCEDURE — 80143 DRUG ASSAY ACETAMINOPHEN: CPT

## 2023-11-29 PROCEDURE — 83735 ASSAY OF MAGNESIUM: CPT

## 2023-11-29 PROCEDURE — 2000000000 HC ICU R&B

## 2023-11-29 PROCEDURE — 85025 COMPLETE CBC W/AUTO DIFF WBC: CPT

## 2023-11-29 PROCEDURE — 2500000003 HC RX 250 WO HCPCS: Performed by: STUDENT IN AN ORGANIZED HEALTH CARE EDUCATION/TRAINING PROGRAM

## 2023-11-29 PROCEDURE — 70553 MRI BRAIN STEM W/O & W/DYE: CPT

## 2023-11-29 PROCEDURE — 6360000004 HC RX CONTRAST MEDICATION: Performed by: EMERGENCY MEDICINE

## 2023-11-29 PROCEDURE — A9579 GAD-BASE MR CONTRAST NOS,1ML: HCPCS | Performed by: EMERGENCY MEDICINE

## 2023-11-29 PROCEDURE — 93005 ELECTROCARDIOGRAM TRACING: CPT | Performed by: STUDENT IN AN ORGANIZED HEALTH CARE EDUCATION/TRAINING PROGRAM

## 2023-11-29 PROCEDURE — 36415 COLL VENOUS BLD VENIPUNCTURE: CPT

## 2023-11-29 PROCEDURE — 85610 PROTHROMBIN TIME: CPT

## 2023-11-29 PROCEDURE — G0480 DRUG TEST DEF 1-7 CLASSES: HCPCS

## 2023-11-29 PROCEDURE — 80179 DRUG ASSAY SALICYLATE: CPT

## 2023-11-29 PROCEDURE — 6370000000 HC RX 637 (ALT 250 FOR IP): Performed by: EMERGENCY MEDICINE

## 2023-11-29 PROCEDURE — 71045 X-RAY EXAM CHEST 1 VIEW: CPT

## 2023-11-29 PROCEDURE — 6360000002 HC RX W HCPCS: Performed by: EMERGENCY MEDICINE

## 2023-11-29 PROCEDURE — 84484 ASSAY OF TROPONIN QUANT: CPT

## 2023-11-29 PROCEDURE — 84439 ASSAY OF FREE THYROXINE: CPT

## 2023-11-29 PROCEDURE — 70450 CT HEAD/BRAIN W/O DYE: CPT

## 2023-11-29 PROCEDURE — 83690 ASSAY OF LIPASE: CPT

## 2023-11-29 PROCEDURE — 71260 CT THORAX DX C+: CPT

## 2023-11-29 PROCEDURE — 2580000003 HC RX 258: Performed by: EMERGENCY MEDICINE

## 2023-11-29 PROCEDURE — 80053 COMPREHEN METABOLIC PANEL: CPT

## 2023-11-29 PROCEDURE — 70498 CT ANGIOGRAPHY NECK: CPT

## 2023-11-29 PROCEDURE — 99285 EMERGENCY DEPT VISIT HI MDM: CPT

## 2023-11-29 PROCEDURE — 2500000003 HC RX 250 WO HCPCS: Performed by: EMERGENCY MEDICINE

## 2023-11-29 PROCEDURE — 84443 ASSAY THYROID STIM HORMONE: CPT

## 2023-11-29 PROCEDURE — 80307 DRUG TEST PRSMV CHEM ANLYZR: CPT

## 2023-11-29 RX ORDER — ONDANSETRON 4 MG/1
4 TABLET, ORALLY DISINTEGRATING ORAL EVERY 8 HOURS PRN
Status: DISCONTINUED | OUTPATIENT
Start: 2023-11-29 | End: 2023-12-04 | Stop reason: HOSPADM

## 2023-11-29 RX ORDER — SODIUM CHLORIDE 9 MG/ML
INJECTION, SOLUTION INTRAVENOUS PRN
Status: DISCONTINUED | OUTPATIENT
Start: 2023-11-29 | End: 2023-12-04 | Stop reason: HOSPADM

## 2023-11-29 RX ORDER — NICARDIPINE HYDROCHLORIDE 0.1 MG/ML
2.5-15 INJECTION INTRAVENOUS CONTINUOUS
Status: DISCONTINUED | OUTPATIENT
Start: 2023-11-29 | End: 2023-11-30

## 2023-11-29 RX ORDER — SODIUM CHLORIDE 9 MG/ML
INJECTION, SOLUTION INTRAVENOUS CONTINUOUS
Status: DISCONTINUED | OUTPATIENT
Start: 2023-11-29 | End: 2023-12-03

## 2023-11-29 RX ORDER — LEVETIRACETAM 5 MG/ML
500 INJECTION INTRAVASCULAR 2 TIMES DAILY
Status: DISCONTINUED | OUTPATIENT
Start: 2023-11-29 | End: 2023-11-30 | Stop reason: SDUPTHER

## 2023-11-29 RX ORDER — SODIUM CHLORIDE 0.9 % (FLUSH) 0.9 %
5-40 SYRINGE (ML) INJECTION EVERY 12 HOURS SCHEDULED
Status: DISCONTINUED | OUTPATIENT
Start: 2023-11-29 | End: 2023-12-04 | Stop reason: HOSPADM

## 2023-11-29 RX ORDER — ONDANSETRON 2 MG/ML
4 INJECTION INTRAMUSCULAR; INTRAVENOUS EVERY 6 HOURS PRN
Status: DISCONTINUED | OUTPATIENT
Start: 2023-11-29 | End: 2023-12-04 | Stop reason: HOSPADM

## 2023-11-29 RX ORDER — 0.9 % SODIUM CHLORIDE 0.9 %
100 INTRAVENOUS SOLUTION INTRAVENOUS ONCE
Status: COMPLETED | OUTPATIENT
Start: 2023-11-29 | End: 2023-11-29

## 2023-11-29 RX ORDER — SODIUM CHLORIDE 0.9 % (FLUSH) 0.9 %
5-40 SYRINGE (ML) INJECTION PRN
Status: DISCONTINUED | OUTPATIENT
Start: 2023-11-29 | End: 2023-12-04 | Stop reason: HOSPADM

## 2023-11-29 RX ORDER — ACETAMINOPHEN 325 MG/1
650 TABLET ORAL EVERY 4 HOURS PRN
Status: DISCONTINUED | OUTPATIENT
Start: 2023-11-29 | End: 2023-12-04 | Stop reason: HOSPADM

## 2023-11-29 RX ORDER — SODIUM CHLORIDE 0.9 % (FLUSH) 0.9 %
10 SYRINGE (ML) INJECTION PRN
Status: COMPLETED | OUTPATIENT
Start: 2023-11-29 | End: 2023-11-29

## 2023-11-29 RX ORDER — SODIUM CHLORIDE 0.9 % (FLUSH) 0.9 %
10 SYRINGE (ML) INJECTION PRN
Status: DISCONTINUED | OUTPATIENT
Start: 2023-11-29 | End: 2023-11-29 | Stop reason: HOSPADM

## 2023-11-29 RX ADMIN — IOPAMIDOL 75 ML: 755 INJECTION, SOLUTION INTRAVENOUS at 16:19

## 2023-11-29 RX ADMIN — SODIUM CHLORIDE 100 ML: 9 INJECTION, SOLUTION INTRAVENOUS at 16:20

## 2023-11-29 RX ADMIN — SODIUM CHLORIDE 5 MG/HR: 9 INJECTION, SOLUTION INTRAVENOUS at 16:43

## 2023-11-29 RX ADMIN — LEVETIRACETAM 500 MG: 5 INJECTION, SOLUTION INTRAVENOUS at 20:40

## 2023-11-29 RX ADMIN — IOPAMIDOL 75 ML: 755 INJECTION, SOLUTION INTRAVENOUS at 19:29

## 2023-11-29 RX ADMIN — ACETAMINOPHEN 650 MG: 325 TABLET ORAL at 23:02

## 2023-11-29 RX ADMIN — SODIUM CHLORIDE: 9 INJECTION, SOLUTION INTRAVENOUS at 20:46

## 2023-11-29 RX ADMIN — SODIUM CHLORIDE, PRESERVATIVE FREE 10 ML: 5 INJECTION INTRAVENOUS at 16:20

## 2023-11-29 RX ADMIN — GADOTERIDOL 8 ML: 279.3 INJECTION, SOLUTION INTRAVENOUS at 21:25

## 2023-11-29 RX ADMIN — SODIUM CHLORIDE, PRESERVATIVE FREE 10 ML: 5 INJECTION INTRAVENOUS at 21:25

## 2023-11-29 RX ADMIN — NICARDIPINE HYDROCHLORIDE 5 MG/HR: 0.1 INJECTION INTRAVENOUS at 20:40

## 2023-11-29 ASSESSMENT — PAIN SCALES - GENERAL: PAINLEVEL_OUTOF10: 3

## 2023-11-29 ASSESSMENT — ENCOUNTER SYMPTOMS
COUGH: 0
CONSTIPATION: 0
NAUSEA: 0
ABDOMINAL PAIN: 0
SHORTNESS OF BREATH: 0
ABDOMINAL PAIN: 0
BACK PAIN: 0
VOMITING: 0
BACK PAIN: 0
SHORTNESS OF BREATH: 0
DIARRHEA: 0
COLOR CHANGE: 0
EYE PAIN: 0

## 2023-11-29 ASSESSMENT — PAIN - FUNCTIONAL ASSESSMENT
PAIN_FUNCTIONAL_ASSESSMENT: NONE - DENIES PAIN
PAIN_FUNCTIONAL_ASSESSMENT: NONE - DENIES PAIN

## 2023-11-29 ASSESSMENT — PAIN DESCRIPTION - LOCATION: LOCATION: HEAD

## 2023-11-29 ASSESSMENT — LIFESTYLE VARIABLES
HOW MANY STANDARD DRINKS CONTAINING ALCOHOL DO YOU HAVE ON A TYPICAL DAY: 1 OR 2
HOW OFTEN DO YOU HAVE A DRINK CONTAINING ALCOHOL: MONTHLY OR LESS

## 2023-11-29 ASSESSMENT — PAIN DESCRIPTION - ORIENTATION: ORIENTATION: MID

## 2023-11-29 NOTE — H&P
Neuro ICU History & Physical    Patient Name: Wendy Jean  Patient : 1950  Room/Bed:   Code Status: Full  Allergies: Allergies   Allergen Reactions    Pcn [Penicillins] Swelling     When she was little, had swelling requiring epinephrine. CHIEF COMPLAINT     Vision changes    HPI    History Obtained From: EMR, patient    The patient is a 74 y.o. female with history of HTN, afib not on AC, and smoking presented with right occipital intraparenchymal hemorrhage. Patient reports for the past 3 days she has been unsteady on her feet, denies any fall. Family also reports intermittent brief staring spells. Presented to [facility] today for evaluation. CT head showed right parietal lobe with extension into the occipital horn of the right lateral ventricle. CTA head/neck unremarkable. Initial 's on Cardene infusion. Per patient, she takes aspirin multiple times per day due to hip pain. Of note, patient reports she drinks 1 beer per day and the occasional mixed drink. Smokes heavily since age 10, not interested in smoking cessation. ICH score: 0    Admitted to ICU From: ER   Reason for ICU Admission: IPH       PATIENT HISTORY   Past Medical History:        Diagnosis Date    Hypertension        Past Surgical History:        Procedure Laterality Date    FEMUR FRACTURE SURGERY Left 2022    HIP TFN performed by Michael Mcclain MD at [address] History:   Social History     Socioeconomic History    Marital status:       Spouse name: Not on file    Number of children: Not on file    Years of education: Not on file    Highest education level: Not on file   Occupational History    Not on file   Tobacco Use    Smoking status: Every Day     Types: Cigarettes    Smokeless tobacco: Never   Substance and Sexual Activity    Alcohol use: Never    Drug use: Never    Sexual activity: Never   Other Topics Concern    Not on file   Social History Narrative    Not on file     Social

## 2023-11-29 NOTE — ED PROVIDER NOTES
[facility] ED  Emergency Department Encounter  Emergency Medicine Resident     Pt Name:Jaida Hutchins  MRN: [id number]  [address] [facility] 1950  Date of evaluation: 11/29/23  PCP:  No primary care provider on file. Note Started: 5:48 PM EST      CHIEF COMPLAINT       Chief Complaint   Patient presents with    Fatigue       HISTORY OF PRESENT ILLNESS  (Location/Symptom, Timing/Onset, Context/Setting, Quality, Duration, Modifying Factors, Severity.)      Ignacio Francis is a 74 y.o. female who presents with via EMS as a transfer from outside facility due to an intraventricular/intraparenchymal bleed. Patient's daughter provides most history as she stated getting patient to the emergency department was difficult due to \"stubbornness. \"  Patient's daughter states that over the past few days patient has been acting differently. States that she has been having staring episodes. Has been walking with a limp favoring her left leg and walking into walls that she does not see is there. Patient is alert and oriented x3, GCS 15. However states that she does not recall these events. Patient's daughter states that this evening her behavior got to a point that she wanted to take patient to the emergency department. At outside facility patient received a CT scan of her head which showed a intraventricular and intraparenchymal bleed. Patient did have a hip surgery last year and states that she has been taking aspirin like \"candy\". Denies any pain at this time. No headache, change in vision or hearing, nausea or vomiting, chest pain or shortness of breath. Denies any trauma. Denies being on blood thinners. Patient started on Cardene drip at outside facility due to elevated blood pressure. PAST MEDICAL / SURGICAL / SOCIAL / FAMILY HISTORY      has a past medical history of Hypertension. has a past surgical history that includes Femur fracture surgery (Left, 6/23/2022).     Social History     Socioeconomic

## 2023-11-29 NOTE — ED PROVIDER NOTES
EMERGENCY DEPARTMENT ENCOUNTER    Pt Name: Miguelangel Duran  MRN: [id number]  [address] [facility] 1950  Date of evaluation: 11/29/23  CHIEF COMPLAINT       Chief Complaint   Patient presents with    Hypertension    Fatigue     HISTORY OF PRESENT ILLNESS   40-year-old female presents for complaints of fatigue, confusion and reported gait issue. Patient presents with her daughter, daughter reports that patient acting off since Monday night reportedly got home and she reports that the patient was in the kitchen and had reportedly forgotten where she had put her water and was staring off. Daughter reports since that time she still seems a little off and reports that she seems like her gait is off and she has been running into the walls. Denies any new numbness tingling or weakness to the extremities denies any significant new vision changes, denies any dizziness or lightheadedness denies any chest pain or shortness of breath denies any recent falls or head trauma    The history is provided by the patient and a relative (Daughter). REVIEW OF SYSTEMS     Review of Systems   Constitutional:  Positive for fatigue. Negative for fever. HENT:  Negative for congestion and ear pain. Eyes:  Negative for pain. Respiratory:  Negative for shortness of breath. Cardiovascular:  Negative for chest pain, palpitations and leg swelling. Gastrointestinal:  Negative for abdominal pain. Genitourinary:  Negative for dysuria and flank pain. Musculoskeletal:  Positive for gait problem. Negative for back pain. Skin:  Negative for color change. Neurological:  Negative for numbness and headaches. Psychiatric/Behavioral:  Negative for confusion. All other systems reviewed and are negative.     PASTMEDICAL HISTORY     Past Medical History:   Diagnosis Date    Hypertension      Past Problem List  Patient Active Problem List   Diagnosis Code    Closed left hip fracture, initial encounter ([address] [facility]) S72.002A    Intraparenchymal and neck. 4. There is a 2 cm density in the visualized left lung apex. Follow-up chest   CT is recommended for complete evaluation. Critical results were called by Dr. Arti Murillo to Dr. Juan Knight on   11/29/2023 at 16:47. XR CHEST PORTABLE   Final Result   No acute process. LABS: Lab orders shown below, the results are reviewed by myself, and all abnormals are listed below. Labs Reviewed   CBC WITH AUTO DIFFERENTIAL - Abnormal; Notable for the following components:       Result Value    .6 (*)     Neutrophils % 81 (*)     Lymphocytes % 10 (*)     Lymphocytes Absolute 0.80 (*)     All other components within normal limits   COMPREHENSIVE METABOLIC PANEL   LIPASE   MAGNESIUM   PROTIME-INR   TROPONIN   TSH   T4, FREE   URINALYSIS       Vitals Reviewed:    Vitals:    11/29/23 1629 11/29/23 1630 11/29/23 1641 11/29/23 1645   BP: (!) 165/96 (!) 161/96 (!) 164/103 (!) 154/104   Pulse: 79 81 74 75   Resp: 30 (!) 31 28 27   Temp:       TempSrc:       SpO2: 94% 94%  95%   Weight:       Height:         MEDICATIONS GIVEN TO PATIENT THIS ENCOUNTER:  Orders Placed This Encounter   Medications    sodium chloride 0.9 % bolus 100 mL    DISCONTD: sodium chloride flush 0.9 % injection 10 mL    iopamidol (ISOVUE-370) 76 % injection 75 mL    DISCONTD: niCARdipine (CARDENE) 25 mg in sodium chloride 0.9 % 250 mL infusion     Order Specific Question:   Titrate Infusion? Answer:   Yes     Order Specific Question:   Initial Infusion Rate: Answer:   5 mg/hr     Order Specific Question:   Goal of Therapy is: Answer:   SBP less than 140 mmHg     Order Specific Question:   Contact Provider if:     Answer:   Patient is receiving the maximum dose and is not achieving the goal of therapy     DISCHARGE PRESCRIPTIONS:  Discharge Medication List as of 11/29/2023  5:09 PM        PHYSICIAN CONSULTS ORDERED THIS ENCOUNTER:  None  FINAL IMPRESSION      1.  Intracranial hemorrhage (HCC)          DISPOSITION/PLAN

## 2023-11-29 NOTE — ED NOTES
67d terrell Zayda Goldman 1950, abnormal mental status and activity since Monday night, staring into space, walking into walls, hypertensive, no anticoagulation. CT brain shows right occipital intraparenchymal hemorrhage with intraventricular involvement. On Cardene drip. Neurology and neurosurgery will be paged. ED to ED.      Germán Mckeon RN  11/29/23 3805

## 2023-11-29 NOTE — ED NOTES
Pt presents to the ED from [facility] for altered mental status since 11/27/23. CT from [facility] shows right occipital intraparenchymal hemorrhage. [facility] reports pt was hypertensive and started on Cardene. [facility] reports pt stated taking multiple 325 mg ASA through out the day. Family reported altered mental status since Monday including \"staring episodes and walking into parked cars\". Pt reports possible unwitnessed fall within last two days. Upon arrival pt is a/o x3. VSS. Pt placed on cardiac monitor. No distress noted. ED staff at bedside.       Talia Matos, RN  11/29/23 [address] [facility], [address] [place] [address], RN  11/29/23 2060

## 2023-11-29 NOTE — ED NOTES
Neurosurgery at [address] [place] [address], [address] [place] [address]  11/29/23 174

## 2023-11-29 NOTE — ED NOTES
Pt called out to use restroom. Writer placed pure wick on pt and offered brief. Pt refused brief.      Evelina Phan RN  11/29/23 0334

## 2023-11-29 NOTE — ED TRIAGE NOTES
Mode of arrival (squad #, walk in, police, etc) : Squad        Chief complaint(s): Fatigue, Hypertension, Tachycardiac        Arrival Note (brief scenario, treatment PTA, etc). : Patient was brought in by EMS from home. Patient is complaining of weakness/ fatigue that began yesterday evening. Patient's BP is slightly elevated, along with patient being tachycardiac. Patient denies any pain, and patient is only prescribed aspirin which patient confirms still taking. C= \"Have you ever felt that you should Cut down on your drinking? \"  No  A= \"Have people Annoyed you by criticizing your drinking? \"  No  G= \"Have you ever felt bad or Guilty about your drinking? \"  No  E= \"Have you ever had a drink as an Eye-opener first thing in the morning to steady your nerves or to help a hangover? \"  No      Deferred []      Reason for deferring: N/A    *If yes to two or more: probable alcohol abuse. *

## 2023-11-30 ENCOUNTER — APPOINTMENT (OUTPATIENT)
Dept: CT IMAGING | Age: 73
DRG: 064 | End: 2023-11-30
Payer: MEDICARE

## 2023-11-30 ENCOUNTER — APPOINTMENT (OUTPATIENT)
Dept: MRI IMAGING | Age: 73
DRG: 064 | End: 2023-11-30
Payer: MEDICARE

## 2023-11-30 ENCOUNTER — APPOINTMENT (OUTPATIENT)
Dept: GENERAL RADIOLOGY | Age: 73
DRG: 064 | End: 2023-11-30
Payer: MEDICARE

## 2023-11-30 PROBLEM — I61.5 INTRAVENTRICULAR HEMORRHAGE (HCC): Status: ACTIVE | Noted: 2023-11-30

## 2023-11-30 LAB
ABO + RH BLD: NORMAL
ANION GAP SERPL CALCULATED.3IONS-SCNC: 13 MMOL/L (ref 9–17)
ARM BAND NUMBER: NORMAL
BLOOD BANK SAMPLE EXPIRATION: NORMAL
BLOOD GROUP ANTIBODIES SERPL: NEGATIVE
BUN SERPL-MCNC: 10 MG/DL (ref 8–23)
CALCIUM SERPL-MCNC: 8.7 MG/DL (ref 8.6–10.4)
CHLORIDE SERPL-SCNC: 106 MMOL/L (ref 98–107)
CHOLEST SERPL-MCNC: 177 MG/DL
CHOLESTEROL/HDL RATIO: 3.8
CO2 SERPL-SCNC: 23 MMOL/L (ref 20–31)
CREAT SERPL-MCNC: 0.4 MG/DL (ref 0.5–0.9)
EKG ATRIAL RATE: 123 BPM
EKG ATRIAL RATE: 81 BPM
EKG P AXIS: 54 DEGREES
EKG P AXIS: 79 DEGREES
EKG P-R INTERVAL: 150 MS
EKG P-R INTERVAL: 158 MS
EKG Q-T INTERVAL: 334 MS
EKG Q-T INTERVAL: 402 MS
EKG QRS DURATION: 78 MS
EKG QRS DURATION: 80 MS
EKG QTC CALCULATION (BAZETT): 466 MS
EKG QTC CALCULATION (BAZETT): 478 MS
EKG R AXIS: -70 DEGREES
EKG R AXIS: -80 DEGREES
EKG T AXIS: 67 DEGREES
EKG T AXIS: 82 DEGREES
EKG VENTRICULAR RATE: 123 BPM
EKG VENTRICULAR RATE: 81 BPM
ERYTHROCYTE [DISTWIDTH] IN BLOOD BY AUTOMATED COUNT: 13.6 % (ref 11.8–14.4)
EST. AVERAGE GLUCOSE BLD GHB EST-MCNC: 103 MG/DL
GFR SERPL CREATININE-BSD FRML MDRD: >60 ML/MIN/1.73M2
GLUCOSE SERPL-MCNC: 83 MG/DL (ref 70–99)
HBA1C MFR BLD: 5.2 % (ref 4–6)
HCT VFR BLD AUTO: 41.2 % (ref 36.3–47.1)
HDLC SERPL-MCNC: 46 MG/DL
HGB BLD-MCNC: 13.9 G/DL (ref 11.9–15.1)
LDLC SERPL CALC-MCNC: 115 MG/DL (ref 0–130)
MAGNESIUM SERPL-MCNC: 1.8 MG/DL (ref 1.6–2.6)
MCH RBC QN AUTO: 33.3 PG (ref 25.2–33.5)
MCHC RBC AUTO-ENTMCNC: 33.7 G/DL (ref 28.4–34.8)
MCV RBC AUTO: 98.6 FL (ref 82.6–102.9)
NRBC BLD-RTO: 0 PER 100 WBC
PLATELET # BLD AUTO: 149 K/UL (ref 138–453)
PMV BLD AUTO: 10.8 FL (ref 8.1–13.5)
POTASSIUM SERPL-SCNC: 3.6 MMOL/L (ref 3.7–5.3)
RBC # BLD AUTO: 4.18 M/UL (ref 3.95–5.11)
SODIUM SERPL-SCNC: 142 MMOL/L (ref 135–144)
TRIGL SERPL-MCNC: 81 MG/DL
TROPONIN I SERPL HS-MCNC: 12 NG/L (ref 0–14)
WBC OTHER # BLD: 8.7 K/UL (ref 3.5–11.3)

## 2023-11-30 PROCEDURE — 6360000002 HC RX W HCPCS: Performed by: EMERGENCY MEDICINE

## 2023-11-30 PROCEDURE — 70551 MRI BRAIN STEM W/O DYE: CPT

## 2023-11-30 PROCEDURE — 93010 ELECTROCARDIOGRAM REPORT: CPT | Performed by: INTERNAL MEDICINE

## 2023-11-30 PROCEDURE — 86901 BLOOD TYPING SEROLOGIC RH(D): CPT

## 2023-11-30 PROCEDURE — 94640 AIRWAY INHALATION TREATMENT: CPT

## 2023-11-30 PROCEDURE — 97530 THERAPEUTIC ACTIVITIES: CPT

## 2023-11-30 PROCEDURE — 99222 1ST HOSP IP/OBS MODERATE 55: CPT | Performed by: INTERNAL MEDICINE

## 2023-11-30 PROCEDURE — 85027 COMPLETE CBC AUTOMATED: CPT

## 2023-11-30 PROCEDURE — 2060000000 HC ICU INTERMEDIATE R&B

## 2023-11-30 PROCEDURE — 6360000004 HC RX CONTRAST MEDICATION: Performed by: NURSE PRACTITIONER

## 2023-11-30 PROCEDURE — 93005 ELECTROCARDIOGRAM TRACING: CPT | Performed by: EMERGENCY MEDICINE

## 2023-11-30 PROCEDURE — 83036 HEMOGLOBIN GLYCOSYLATED A1C: CPT

## 2023-11-30 PROCEDURE — 94664 DEMO&/EVAL PT USE INHALER: CPT

## 2023-11-30 PROCEDURE — 80061 LIPID PANEL: CPT

## 2023-11-30 PROCEDURE — 6370000000 HC RX 637 (ALT 250 FOR IP): Performed by: EMERGENCY MEDICINE

## 2023-11-30 PROCEDURE — 36415 COLL VENOUS BLD VENIPUNCTURE: CPT

## 2023-11-30 PROCEDURE — APPSS30 APP SPLIT SHARED TIME 16-30 MINUTES: Performed by: PHYSICIAN ASSISTANT

## 2023-11-30 PROCEDURE — 84484 ASSAY OF TROPONIN QUANT: CPT

## 2023-11-30 PROCEDURE — 99223 1ST HOSP IP/OBS HIGH 75: CPT | Performed by: NEUROLOGICAL SURGERY

## 2023-11-30 PROCEDURE — 74177 CT ABD & PELVIS W/CONTRAST: CPT

## 2023-11-30 PROCEDURE — 70450 CT HEAD/BRAIN W/O DYE: CPT

## 2023-11-30 PROCEDURE — 97535 SELF CARE MNGMENT TRAINING: CPT

## 2023-11-30 PROCEDURE — 94761 N-INVAS EAR/PLS OXIMETRY MLT: CPT

## 2023-11-30 PROCEDURE — 6370000000 HC RX 637 (ALT 250 FOR IP): Performed by: NURSE PRACTITIONER

## 2023-11-30 PROCEDURE — 71045 X-RAY EXAM CHEST 1 VIEW: CPT

## 2023-11-30 PROCEDURE — 86900 BLOOD TYPING SEROLOGIC ABO: CPT

## 2023-11-30 PROCEDURE — 6360000002 HC RX W HCPCS: Performed by: NURSE PRACTITIONER

## 2023-11-30 PROCEDURE — 86850 RBC ANTIBODY SCREEN: CPT

## 2023-11-30 PROCEDURE — 6370000000 HC RX 637 (ALT 250 FOR IP)

## 2023-11-30 PROCEDURE — 83735 ASSAY OF MAGNESIUM: CPT

## 2023-11-30 PROCEDURE — 97166 OT EVAL MOD COMPLEX 45 MIN: CPT

## 2023-11-30 PROCEDURE — 80048 BASIC METABOLIC PNL TOTAL CA: CPT

## 2023-11-30 PROCEDURE — 97162 PT EVAL MOD COMPLEX 30 MIN: CPT

## 2023-11-30 PROCEDURE — 99291 CRITICAL CARE FIRST HOUR: CPT | Performed by: PSYCHIATRY & NEUROLOGY

## 2023-11-30 PROCEDURE — 2580000003 HC RX 258: Performed by: EMERGENCY MEDICINE

## 2023-11-30 RX ORDER — MAGNESIUM SULFATE IN WATER 40 MG/ML
2000 INJECTION, SOLUTION INTRAVENOUS ONCE
Status: COMPLETED | OUTPATIENT
Start: 2023-11-30 | End: 2023-11-30

## 2023-11-30 RX ORDER — ENOXAPARIN SODIUM 100 MG/ML
30 INJECTION SUBCUTANEOUS DAILY
Status: DISCONTINUED | OUTPATIENT
Start: 2023-11-30 | End: 2023-12-04 | Stop reason: HOSPADM

## 2023-11-30 RX ORDER — LEVETIRACETAM 500 MG/5ML
500 INJECTION, SOLUTION, CONCENTRATE INTRAVENOUS EVERY 12 HOURS
Status: DISCONTINUED | OUTPATIENT
Start: 2023-11-30 | End: 2023-12-04

## 2023-11-30 RX ORDER — LEVETIRACETAM 500 MG/5ML
500 INJECTION, SOLUTION, CONCENTRATE INTRAVENOUS 2 TIMES DAILY
Status: DISCONTINUED | OUTPATIENT
Start: 2023-11-30 | End: 2023-11-30

## 2023-11-30 RX ORDER — HYDRALAZINE HYDROCHLORIDE 20 MG/ML
10 INJECTION INTRAMUSCULAR; INTRAVENOUS ONCE
Status: COMPLETED | OUTPATIENT
Start: 2023-11-30 | End: 2023-11-30

## 2023-11-30 RX ORDER — MAGNESIUM SULFATE 1 G/100ML
1000 INJECTION INTRAVENOUS ONCE
Status: COMPLETED | OUTPATIENT
Start: 2023-11-30 | End: 2023-11-30

## 2023-11-30 RX ORDER — HYDRALAZINE HYDROCHLORIDE 20 MG/ML
5 INJECTION INTRAMUSCULAR; INTRAVENOUS EVERY 6 HOURS PRN
Status: DISCONTINUED | OUTPATIENT
Start: 2023-11-30 | End: 2023-12-03

## 2023-11-30 RX ORDER — OXYCODONE HYDROCHLORIDE AND ACETAMINOPHEN 5; 325 MG/1; MG/1
1 TABLET ORAL EVERY 6 HOURS PRN
Status: DISCONTINUED | OUTPATIENT
Start: 2023-11-30 | End: 2023-12-04 | Stop reason: HOSPADM

## 2023-11-30 RX ORDER — 0.9 % SODIUM CHLORIDE 0.9 %
500 INTRAVENOUS SOLUTION INTRAVENOUS ONCE
Status: COMPLETED | OUTPATIENT
Start: 2023-11-30 | End: 2023-11-30

## 2023-11-30 RX ORDER — POTASSIUM CHLORIDE 20 MEQ/1
40 TABLET, EXTENDED RELEASE ORAL ONCE
Status: COMPLETED | OUTPATIENT
Start: 2023-11-30 | End: 2023-11-30

## 2023-11-30 RX ORDER — IPRATROPIUM BROMIDE AND ALBUTEROL SULFATE 2.5; .5 MG/3ML; MG/3ML
1 SOLUTION RESPIRATORY (INHALATION) ONCE
Status: COMPLETED | OUTPATIENT
Start: 2023-11-30 | End: 2023-11-30

## 2023-11-30 RX ADMIN — POTASSIUM CHLORIDE 40 MEQ: 1500 TABLET, EXTENDED RELEASE ORAL at 10:23

## 2023-11-30 RX ADMIN — OXYCODONE AND ACETAMINOPHEN 1 TABLET: 5; 325 TABLET ORAL at 20:34

## 2023-11-30 RX ADMIN — IPRATROPIUM BROMIDE AND ALBUTEROL SULFATE 1 DOSE: 2.5; .5 SOLUTION RESPIRATORY (INHALATION) at 17:40

## 2023-11-30 RX ADMIN — LEVETIRACETAM 500 MG: 500 INJECTION INTRAVENOUS at 08:33

## 2023-11-30 RX ADMIN — SODIUM CHLORIDE, PRESERVATIVE FREE 10 ML: 5 INJECTION INTRAVENOUS at 21:31

## 2023-11-30 RX ADMIN — ONDANSETRON 4 MG: 2 INJECTION INTRAMUSCULAR; INTRAVENOUS at 18:32

## 2023-11-30 RX ADMIN — SODIUM CHLORIDE, PRESERVATIVE FREE 10 ML: 5 INJECTION INTRAVENOUS at 08:34

## 2023-11-30 RX ADMIN — LEVETIRACETAM 500 MG: 500 INJECTION, SOLUTION, CONCENTRATE INTRAVENOUS at 20:33

## 2023-11-30 RX ADMIN — MAGNESIUM SULFATE HEPTAHYDRATE 2000 MG: 40 INJECTION, SOLUTION INTRAVENOUS at 18:23

## 2023-11-30 RX ADMIN — MAGNESIUM SULFATE HEPTAHYDRATE 1000 MG: 1 INJECTION, SOLUTION INTRAVENOUS at 10:25

## 2023-11-30 RX ADMIN — SODIUM CHLORIDE 500 ML: 9 INJECTION, SOLUTION INTRAVENOUS at 17:40

## 2023-11-30 RX ADMIN — ACETAMINOPHEN 650 MG: 325 TABLET ORAL at 06:18

## 2023-11-30 RX ADMIN — ENOXAPARIN SODIUM 30 MG: 100 INJECTION SUBCUTANEOUS at 10:23

## 2023-11-30 RX ADMIN — METOPROLOL TARTRATE 12.5 MG: 25 TABLET, FILM COATED ORAL at 10:23

## 2023-11-30 RX ADMIN — HYDRALAZINE HYDROCHLORIDE 10 MG: 20 INJECTION, SOLUTION INTRAMUSCULAR; INTRAVENOUS at 17:04

## 2023-11-30 RX ADMIN — METOPROLOL TARTRATE 12.5 MG: 25 TABLET, FILM COATED ORAL at 20:33

## 2023-11-30 RX ADMIN — IOPAMIDOL 75 ML: 755 INJECTION, SOLUTION INTRAVENOUS at 09:24

## 2023-11-30 RX ADMIN — SODIUM CHLORIDE: 9 INJECTION, SOLUTION INTRAVENOUS at 18:21

## 2023-11-30 ASSESSMENT — PAIN SCALES - GENERAL
PAINLEVEL_OUTOF10: 0
PAINLEVEL_OUTOF10: 10
PAINLEVEL_OUTOF10: 3

## 2023-11-30 ASSESSMENT — ENCOUNTER SYMPTOMS
NAUSEA: 0
COUGH: 1
DIARRHEA: 0
WHEEZING: 0
BACK PAIN: 0
SHORTNESS OF BREATH: 1
CONSTIPATION: 0

## 2023-11-30 ASSESSMENT — PAIN DESCRIPTION - LOCATION
LOCATION: HEAD
LOCATION: HEAD;LEG;ARM

## 2023-11-30 NOTE — ED NOTES
Pt to room 126 via stretcher on full cardiac monitor via stretcher with IDALMIS Garduno RN  11/29/23 2398

## 2023-11-30 NOTE — CARE COORDINATION
Met with pt to assess for support and complete PHQ-9 screen. Pt stated she lives with her youngest dtr, 2 thi, their girlfriends and 2 other friends of her thi. Stated she has 2 other dtrs and they both live in town. Stated she has 5 grchildren. Pt stated family is supportive and help out as needed. Pt denies any recent feelings of depression/SI. Patient Health Questionnaire-9 (PHQ-9)    Over the last 2 weeks, how often have you been bothered by any of the following problems? 1. Little Interest or pleasure in doing things? [x] Not at all  [] Several Days  [] More than half the day  []  Nearly every day    2. Feeling down, depressed or hopeless? [x] Not at all  [] Several Days  [] More than half the day  []  Nearly every day    3. Trouble falling or staying asleep, or sleeping too much? [x] Not at all  [] Several Days  [] More than half the day  []  Nearly every day    4. Feeling tired or having little energy? [x] Not at all  [] Several Days  [] More than half the day  []  Nearly every day    5. Poor apettite or overeating? [x] Not at all  [] Several Days  [] More than half the day  []  Nearly every day    6. Feeling bad about yourself-or that you are a failure or have let yourself or your family down? [x] Not at all  [] Several Days  [] More than half the day  []  Nearly every day    7. Trouble concentrating on things, such as reading the newspaper or watching television? [x] Not at all  [] Several Days  [] More than half the day  []  Nearly every day    8. Moving or speaking so slowly that other people could have noticed? Or the opposite-being so fidgety or restless that you have been moving around a lot more than usual?   [x] Not at all  [] Several Days  [] More than half the day  []  Nearly every day    9. Thoughts that you would be better off dead or of hurting yourself in some way?    [x] Not at all  [] Several Days  [] More than half the day  []  Nearly every day    Total Score:

## 2023-11-30 NOTE — CARE COORDINATION
Consult received for food insecurity. Met with pt and dtr, with pt permission. Pt stated they  utilize different food pantries in the area, 3 in particular. Stated they get food from the pantries ~ 5x month. Provided pt with a list of all area food pantries. Pt stated she is not on food stamps as she needs her birth certificate to apply and she is still trying to get it from [facility].

## 2023-11-30 NOTE — ED NOTES
Report called to [address] [place] [address],  [phone]      Ivonne Gutierrez RN  11/29/23 2109

## 2023-11-30 NOTE — PLAN OF CARE
Problem: Safety - Adult  Goal: Free from fall injury  11/30/2023 1423 by Tarun Garcia RN  Outcome: Progressing  11/30/2023 0443 by Jacqueline Al RN  Outcome: Progressing   Fall assessment preformed. Bed in low locked position with call light and tray table within reach. Education given. Will continue to monitor. Problem: Pain  Goal: Verbalizes/displays adequate comfort level or baseline comfort level  Outcome: Progressing  Flowsheets (Taken 11/30/2023 0900)  Verbalizes/displays adequate comfort level or baseline comfort level: Encourage patient to monitor pain and request assistance   Pain scale preformed per protocol and pt treated for pain as documented. Education given. Problem: Skin/Tissue Integrity  Goal: Absence of new skin breakdown  11/30/2023 1423 by Tarun Garcia RN  Outcome: Progressing  11/30/2023 0443 by Jacqueline Al RN  Outcome: Progressing   Skin assessment preformed. Pt turned every 2 hours with heals elevated off bed. ICU mattress in place. Will continue to monitor.

## 2023-11-30 NOTE — SIGNIFICANT EVENT
Nursing staff and myself approached by the PT/OT team reporting that the patient had made a comment about \"jumping off a bridge with my 's ashes\". Went to bedside to discuss with the patient if she is currently having suicidal thoughts. Patient adamantly denies suicidal ideation at this time. States \"sometimes I just make comments to get people off my back\". Denies thoughts of hurting self or others. Will continue to monitor. If patient makes repeat comment, can consider psych consult.     Latasha Gloria, DO - PGY2  Neuro-Critical Care

## 2023-11-30 NOTE — CARE COORDINATION
Case Management Assessment  Initial Evaluation    Date/Time of Evaluation: 11/30/2023 11:38 AM  Assessment Completed by: Yannick Cheng RN    If patient is discharged prior to next notation, then this note serves as note for discharge by case management. Patient Name: Joanna Griffin                   YOB: 1950  Diagnosis: Intraventricular hemorrhage [facility]) [I61.5]  Intraparenchymal hemorrhage of brain [facility]) [I61.9]                   Date / Time: 11/29/2023  5:22 PM    Patient Admission Status: Inpatient   Readmission Risk (Low < 19, Mod (19-27), High > 27): Readmission Risk Score: 12.6    Current PCP: No primary care provider on file. PCP verified by CM? Yes (will need list)    Chart Reviewed: Yes      History Provided by: Patient, Child/Family  Patient Orientation: Alert and Oriented    Patient Cognition: Alert    Hospitalization in the last 30 days (Readmission):  No    If yes, Readmission Assessment in CM Navigator will be completed. Advance Directives:      Code Status: Full Code   Patient's Primary Decision Maker is:        Discharge Planning:    Patient lives with: Children Type of Home: House  Primary Care Giver: Self  Patient Support Systems include: Children   Current Financial resources:    Current community resources:    Current services prior to admission: None            Current DME:              Type of Home Care services:  None    ADLS  Prior functional level: Independent in ADLs/IADLs  Current functional level: Independent in ADLs/IADLs    PT AM-PAC:   /24  OT AM-PAC:   /24    Family can provide assistance at DC: Yes  Would you like Case Management to discuss the discharge plan with any other family members/significant others, and if so, who?  No  Plans to Return to Present Housing: Unknown at present  Other Identified Issues/Barriers to RETURNING to current housing: will need to monitor PT/OT evals to assess for needs, pt lives with multiple family members who are able to

## 2023-12-01 PROBLEM — F17.200 SMOKING: Status: ACTIVE | Noted: 2023-12-01

## 2023-12-01 PROBLEM — B85.0 PEDICULOSIS CAPITIS: Status: ACTIVE | Noted: 2023-12-01

## 2023-12-01 PROBLEM — Z01.818 PRE-OP EVALUATION: Status: ACTIVE | Noted: 2023-12-01

## 2023-12-01 PROBLEM — I48.20 ATRIAL FIBRILLATION, CHRONIC (HCC): Status: ACTIVE | Noted: 2023-12-01

## 2023-12-01 PROBLEM — I10 PRIMARY HYPERTENSION: Status: ACTIVE | Noted: 2023-12-01

## 2023-12-01 LAB
ANION GAP SERPL CALCULATED.3IONS-SCNC: 14 MMOL/L (ref 9–17)
BUN SERPL-MCNC: 14 MG/DL (ref 8–23)
CA-I BLD-SCNC: 1.12 MMOL/L (ref 1.13–1.33)
CALCIUM SERPL-MCNC: 8.6 MG/DL (ref 8.6–10.4)
CHLORIDE SERPL-SCNC: 105 MMOL/L (ref 98–107)
CO2 SERPL-SCNC: 20 MMOL/L (ref 20–31)
CREAT SERPL-MCNC: 0.4 MG/DL (ref 0.5–0.9)
EKG ATRIAL RATE: 94 BPM
EKG P AXIS: 76 DEGREES
EKG P-R INTERVAL: 184 MS
EKG Q-T INTERVAL: 388 MS
EKG QRS DURATION: 88 MS
EKG QTC CALCULATION (BAZETT): 485 MS
EKG R AXIS: -70 DEGREES
EKG T AXIS: -5 DEGREES
EKG VENTRICULAR RATE: 94 BPM
ERYTHROCYTE [DISTWIDTH] IN BLOOD BY AUTOMATED COUNT: 13.5 % (ref 11.8–14.4)
GFR SERPL CREATININE-BSD FRML MDRD: >60 ML/MIN/1.73M2
GLUCOSE SERPL-MCNC: 76 MG/DL (ref 70–99)
HCT VFR BLD AUTO: 42.4 % (ref 36.3–47.1)
HGB BLD-MCNC: 13.3 G/DL (ref 11.9–15.1)
MAGNESIUM SERPL-MCNC: 2.2 MG/DL (ref 1.6–2.6)
MAGNESIUM SERPL-MCNC: 2.7 MG/DL (ref 1.6–2.6)
MCH RBC QN AUTO: 32.9 PG (ref 25.2–33.5)
MCHC RBC AUTO-ENTMCNC: 31.4 G/DL (ref 28.4–34.8)
MCV RBC AUTO: 105 FL (ref 82.6–102.9)
NRBC BLD-RTO: 0 PER 100 WBC
PLATELET # BLD AUTO: 144 K/UL (ref 138–453)
PMV BLD AUTO: 11.1 FL (ref 8.1–13.5)
POTASSIUM SERPL-SCNC: 4.1 MMOL/L (ref 3.7–5.3)
RBC # BLD AUTO: 4.04 M/UL (ref 3.95–5.11)
SODIUM SERPL-SCNC: 139 MMOL/L (ref 135–144)
WBC OTHER # BLD: 7 K/UL (ref 3.5–11.3)

## 2023-12-01 PROCEDURE — 93010 ELECTROCARDIOGRAM REPORT: CPT | Performed by: INTERNAL MEDICINE

## 2023-12-01 PROCEDURE — 80048 BASIC METABOLIC PNL TOTAL CA: CPT

## 2023-12-01 PROCEDURE — 2580000003 HC RX 258: Performed by: EMERGENCY MEDICINE

## 2023-12-01 PROCEDURE — 6370000000 HC RX 637 (ALT 250 FOR IP)

## 2023-12-01 PROCEDURE — 83735 ASSAY OF MAGNESIUM: CPT

## 2023-12-01 PROCEDURE — 99291 CRITICAL CARE FIRST HOUR: CPT | Performed by: NEUROLOGICAL SURGERY

## 2023-12-01 PROCEDURE — 99222 1ST HOSP IP/OBS MODERATE 55: CPT | Performed by: INTERNAL MEDICINE

## 2023-12-01 PROCEDURE — 6370000000 HC RX 637 (ALT 250 FOR IP): Performed by: NURSE PRACTITIONER

## 2023-12-01 PROCEDURE — 6360000002 HC RX W HCPCS: Performed by: EMERGENCY MEDICINE

## 2023-12-01 PROCEDURE — 85027 COMPLETE CBC AUTOMATED: CPT

## 2023-12-01 PROCEDURE — 2060000000 HC ICU INTERMEDIATE R&B

## 2023-12-01 PROCEDURE — 6360000002 HC RX W HCPCS

## 2023-12-01 PROCEDURE — 6370000000 HC RX 637 (ALT 250 FOR IP): Performed by: INTERNAL MEDICINE

## 2023-12-01 PROCEDURE — 92523 SPEECH SOUND LANG COMPREHEN: CPT

## 2023-12-01 PROCEDURE — 99232 SBSQ HOSP IP/OBS MODERATE 35: CPT | Performed by: PSYCHIATRY & NEUROLOGY

## 2023-12-01 PROCEDURE — 82330 ASSAY OF CALCIUM: CPT

## 2023-12-01 PROCEDURE — 99291 CRITICAL CARE FIRST HOUR: CPT | Performed by: INTERNAL MEDICINE

## 2023-12-01 PROCEDURE — 36415 COLL VENOUS BLD VENIPUNCTURE: CPT

## 2023-12-01 RX ORDER — IVERMECTIN 3 MG/1
9 TABLET ORAL ONCE
Status: COMPLETED | OUTPATIENT
Start: 2023-12-01 | End: 2023-12-01

## 2023-12-01 RX ORDER — LABETALOL HYDROCHLORIDE 5 MG/ML
5 INJECTION, SOLUTION INTRAVENOUS EVERY 6 HOURS PRN
Status: DISCONTINUED | OUTPATIENT
Start: 2023-12-01 | End: 2023-12-04 | Stop reason: HOSPADM

## 2023-12-01 RX ADMIN — IVERMECTIN 9 MG: 3 TABLET ORAL at 16:20

## 2023-12-01 RX ADMIN — LEVETIRACETAM 500 MG: 500 INJECTION, SOLUTION, CONCENTRATE INTRAVENOUS at 20:00

## 2023-12-01 RX ADMIN — OXYCODONE AND ACETAMINOPHEN 1 TABLET: 5; 325 TABLET ORAL at 16:21

## 2023-12-01 RX ADMIN — Medication 5 MG: at 02:14

## 2023-12-01 RX ADMIN — LEVETIRACETAM 500 MG: 500 INJECTION, SOLUTION, CONCENTRATE INTRAVENOUS at 08:02

## 2023-12-01 RX ADMIN — OXYCODONE AND ACETAMINOPHEN 1 TABLET: 5; 325 TABLET ORAL at 21:18

## 2023-12-01 RX ADMIN — METOPROLOL TARTRATE 12.5 MG: 25 TABLET, FILM COATED ORAL at 08:02

## 2023-12-01 RX ADMIN — ONDANSETRON 4 MG: 2 INJECTION INTRAMUSCULAR; INTRAVENOUS at 17:51

## 2023-12-01 RX ADMIN — OXYCODONE AND ACETAMINOPHEN 1 TABLET: 5; 325 TABLET ORAL at 06:37

## 2023-12-01 RX ADMIN — Medication: at 22:37

## 2023-12-01 RX ADMIN — SODIUM CHLORIDE, PRESERVATIVE FREE 10 ML: 5 INJECTION INTRAVENOUS at 08:11

## 2023-12-01 RX ADMIN — METOPROLOL TARTRATE 12.5 MG: 25 TABLET, FILM COATED ORAL at 12:16

## 2023-12-01 RX ADMIN — SODIUM CHLORIDE, PRESERVATIVE FREE 10 ML: 5 INJECTION INTRAVENOUS at 21:07

## 2023-12-01 RX ADMIN — METOPROLOL TARTRATE 25 MG: 25 TABLET, FILM COATED ORAL at 21:01

## 2023-12-01 ASSESSMENT — ENCOUNTER SYMPTOMS
CHEST TIGHTNESS: 0
SHORTNESS OF BREATH: 0
EYE PAIN: 0
ABDOMINAL PAIN: 0
ABDOMINAL DISTENTION: 0
BACK PAIN: 0
NAUSEA: 0
RHINORRHEA: 0
WHEEZING: 0
PHOTOPHOBIA: 0
EYE ITCHING: 0
CHOKING: 0
COUGH: 0
DIARRHEA: 0
TROUBLE SWALLOWING: 0
EYE DISCHARGE: 0
CONSTIPATION: 0
VOICE CHANGE: 0
EYE REDNESS: 0
SORE THROAT: 0

## 2023-12-01 ASSESSMENT — PAIN DESCRIPTION - LOCATION
LOCATION: HEAD
LOCATION: NECK;LEG

## 2023-12-01 ASSESSMENT — PAIN SCALES - GENERAL
PAINLEVEL_OUTOF10: 10
PAINLEVEL_OUTOF10: 10

## 2023-12-01 ASSESSMENT — PAIN DESCRIPTION - DESCRIPTORS
DESCRIPTORS: SORE
DESCRIPTORS: THROBBING

## 2023-12-01 ASSESSMENT — PAIN DESCRIPTION - ORIENTATION
ORIENTATION: POSTERIOR
ORIENTATION: MID

## 2023-12-01 NOTE — PLAN OF CARE
Problem: Discharge Planning  Goal: Discharge to home or other facility with appropriate resources  Outcome: Progressing  Flowsheets (Taken 11/30/2023 1843 by Tom Amador RN)  Discharge to home or other facility with appropriate resources: Identify barriers to discharge with patient and caregiver     Problem: Skin/Tissue Integrity  Goal: Absence of new skin breakdown  Description: 1. Monitor for areas of redness and/or skin breakdown  2. Assess vascular access sites hourly  3. Every 4-6 hours minimum:  Change oxygen saturation probe site  4. Every 4-6 hours:  If on nasal continuous positive airway pressure, respiratory therapy assess nares and determine need for appliance change or resting period.   Outcome: Progressing     Problem: Safety - Adult  Goal: Free from fall injury  Outcome: Progressing     Problem: ABCDS Injury Assessment  Goal: Absence of physical injury  Outcome: Progressing     Problem: Pain  Goal: Verbalizes/displays adequate comfort level or baseline comfort level  Outcome: Progressing

## 2023-12-01 NOTE — PLAN OF CARE
Problem: Discharge Planning  Goal: Discharge to home or other facility with appropriate resources  12/1/2023 1706 by Diana Suarez RN  Outcome: Progressing  12/1/2023 1706 by Diana Suarez RN  Outcome: Progressing  12/1/2023 0447 by Yari Felipe RN  Outcome: Progressing  Flowsheets (Taken 11/30/2023 1843 by Vineet Clark RN)  Discharge to home or other facility with appropriate resources: Identify barriers to discharge with patient and caregiver     Problem: Skin/Tissue Integrity  Goal: Absence of new skin breakdown  Description: 1. Monitor for areas of redness and/or skin breakdown  2. Assess vascular access sites hourly  3. Every 4-6 hours minimum:  Change oxygen saturation probe site  4. Every 4-6 hours:  If on nasal continuous positive airway pressure, respiratory therapy assess nares and determine need for appliance change or resting period.   12/1/2023 1706 by Diana Suarez RN  Outcome: Progressing  12/1/2023 1706 by Diana Suarez RN  Outcome: Progressing  12/1/2023 0447 by Yari Felipe RN  Outcome: Progressing     Problem: Safety - Adult  Goal: Free from fall injury  12/1/2023 1706 by Diana Suarez RN  Outcome: Progressing  12/1/2023 1706 by Diana Suarez RN  Outcome: Progressing  12/1/2023 0447 by Yari Felipe RN  Outcome: Progressing     Problem: ABCDS Injury Assessment  Goal: Absence of physical injury  12/1/2023 1706 by Diana Suarez RN  Outcome: Progressing  12/1/2023 1706 by Diana Suarez RN  Outcome: Progressing  12/1/2023 0447 by Yari Felipe RN  Outcome: Progressing     Problem: Pain  Goal: Verbalizes/displays adequate comfort level or baseline comfort level  12/1/2023 1706 by Diana Suarez RN  Outcome: Progressing  12/1/2023 1706 by Diana Suarez RN  Outcome: Progressing  12/1/2023 0447 by Yari Felipe RN  Outcome: Progressing

## 2023-12-02 ENCOUNTER — APPOINTMENT (OUTPATIENT)
Age: 73
DRG: 064 | End: 2023-12-02
Payer: MEDICARE

## 2023-12-02 ENCOUNTER — APPOINTMENT (OUTPATIENT)
Dept: CT IMAGING | Age: 73
DRG: 064 | End: 2023-12-02
Payer: MEDICARE

## 2023-12-02 PROBLEM — R91.8 LUNG MASS: Status: ACTIVE | Noted: 2023-12-02

## 2023-12-02 LAB
ANION GAP SERPL CALCULATED.3IONS-SCNC: 9 MMOL/L (ref 9–17)
BUN SERPL-MCNC: 11 MG/DL (ref 8–23)
CALCIUM SERPL-MCNC: 8.5 MG/DL (ref 8.6–10.4)
CHLORIDE SERPL-SCNC: 101 MMOL/L (ref 98–107)
CO2 SERPL-SCNC: 24 MMOL/L (ref 20–31)
CREAT SERPL-MCNC: 0.3 MG/DL (ref 0.5–0.9)
ERYTHROCYTE [DISTWIDTH] IN BLOOD BY AUTOMATED COUNT: 13.1 % (ref 11.8–14.4)
GFR SERPL CREATININE-BSD FRML MDRD: >60 ML/MIN/1.73M2
GLUCOSE SERPL-MCNC: 105 MG/DL (ref 70–99)
HCT VFR BLD AUTO: 40 % (ref 36.3–47.1)
HGB BLD-MCNC: 13.2 G/DL (ref 11.9–15.1)
MAGNESIUM SERPL-MCNC: 2 MG/DL (ref 1.6–2.6)
MCH RBC QN AUTO: 33.4 PG (ref 25.2–33.5)
MCHC RBC AUTO-ENTMCNC: 33 G/DL (ref 28.4–34.8)
MCV RBC AUTO: 101.3 FL (ref 82.6–102.9)
NRBC BLD-RTO: 0 PER 100 WBC
PLATELET # BLD AUTO: ABNORMAL K/UL (ref 138–453)
PLATELET, FLUORESCENCE: 136 K/UL (ref 138–453)
PLATELETS.RETICULATED NFR BLD AUTO: 8.6 % (ref 1.1–10.3)
POTASSIUM SERPL-SCNC: 4.1 MMOL/L (ref 3.7–5.3)
RBC # BLD AUTO: 3.95 M/UL (ref 3.95–5.11)
SODIUM SERPL-SCNC: 134 MMOL/L (ref 135–144)
WBC OTHER # BLD: 8.7 K/UL (ref 3.5–11.3)

## 2023-12-02 PROCEDURE — 2060000000 HC ICU INTERMEDIATE R&B

## 2023-12-02 PROCEDURE — 6370000000 HC RX 637 (ALT 250 FOR IP): Performed by: NURSE PRACTITIONER

## 2023-12-02 PROCEDURE — 36415 COLL VENOUS BLD VENIPUNCTURE: CPT

## 2023-12-02 PROCEDURE — 6370000000 HC RX 637 (ALT 250 FOR IP)

## 2023-12-02 PROCEDURE — 6370000000 HC RX 637 (ALT 250 FOR IP): Performed by: PSYCHIATRY & NEUROLOGY

## 2023-12-02 PROCEDURE — 6360000002 HC RX W HCPCS

## 2023-12-02 PROCEDURE — 2580000003 HC RX 258: Performed by: EMERGENCY MEDICINE

## 2023-12-02 PROCEDURE — 85055 RETICULATED PLATELET ASSAY: CPT

## 2023-12-02 PROCEDURE — 6360000002 HC RX W HCPCS: Performed by: EMERGENCY MEDICINE

## 2023-12-02 PROCEDURE — 80048 BASIC METABOLIC PNL TOTAL CA: CPT

## 2023-12-02 PROCEDURE — 6360000002 HC RX W HCPCS: Performed by: NURSE PRACTITIONER

## 2023-12-02 PROCEDURE — 99232 SBSQ HOSP IP/OBS MODERATE 35: CPT | Performed by: INTERNAL MEDICINE

## 2023-12-02 PROCEDURE — 83735 ASSAY OF MAGNESIUM: CPT

## 2023-12-02 PROCEDURE — 99231 SBSQ HOSP IP/OBS SF/LOW 25: CPT | Performed by: PSYCHIATRY & NEUROLOGY

## 2023-12-02 PROCEDURE — 70450 CT HEAD/BRAIN W/O DYE: CPT

## 2023-12-02 PROCEDURE — 85027 COMPLETE CBC AUTOMATED: CPT

## 2023-12-02 PROCEDURE — 99223 1ST HOSP IP/OBS HIGH 75: CPT | Performed by: INTERNAL MEDICINE

## 2023-12-02 RX ORDER — FENTANYL CITRATE 50 UG/ML
INJECTION, SOLUTION INTRAMUSCULAR; INTRAVENOUS
Status: COMPLETED
Start: 2023-12-02 | End: 2023-12-02

## 2023-12-02 RX ORDER — LISINOPRIL 20 MG/1
10 TABLET ORAL DAILY
Status: DISCONTINUED | OUTPATIENT
Start: 2023-12-02 | End: 2023-12-03

## 2023-12-02 RX ORDER — FENTANYL CITRATE 50 UG/ML
25 INJECTION, SOLUTION INTRAMUSCULAR; INTRAVENOUS ONCE
Status: COMPLETED | OUTPATIENT
Start: 2023-12-02 | End: 2023-12-02

## 2023-12-02 RX ORDER — METOPROLOL TARTRATE 50 MG/1
50 TABLET, FILM COATED ORAL 2 TIMES DAILY
Status: DISCONTINUED | OUTPATIENT
Start: 2023-12-02 | End: 2023-12-04 | Stop reason: HOSPADM

## 2023-12-02 RX ORDER — HYDRALAZINE HYDROCHLORIDE 20 MG/ML
INJECTION INTRAMUSCULAR; INTRAVENOUS
Status: COMPLETED
Start: 2023-12-02 | End: 2023-12-02

## 2023-12-02 RX ORDER — MAGNESIUM SULFATE IN WATER 40 MG/ML
2000 INJECTION, SOLUTION INTRAVENOUS ONCE
Status: COMPLETED | OUTPATIENT
Start: 2023-12-02 | End: 2023-12-02

## 2023-12-02 RX ADMIN — SODIUM CHLORIDE, PRESERVATIVE FREE 5 ML: 5 INJECTION INTRAVENOUS at 08:22

## 2023-12-02 RX ADMIN — LEVETIRACETAM 500 MG: 500 INJECTION, SOLUTION, CONCENTRATE INTRAVENOUS at 20:58

## 2023-12-02 RX ADMIN — MAGNESIUM SULFATE HEPTAHYDRATE 2000 MG: 40 INJECTION, SOLUTION INTRAVENOUS at 13:24

## 2023-12-02 RX ADMIN — HYDRALAZINE HYDROCHLORIDE 5 MG: 20 INJECTION, SOLUTION INTRAMUSCULAR; INTRAVENOUS at 04:30

## 2023-12-02 RX ADMIN — OXYCODONE AND ACETAMINOPHEN 1 TABLET: 5; 325 TABLET ORAL at 13:20

## 2023-12-02 RX ADMIN — ENOXAPARIN SODIUM 30 MG: 100 INJECTION SUBCUTANEOUS at 09:15

## 2023-12-02 RX ADMIN — OXYCODONE AND ACETAMINOPHEN 1 TABLET: 5; 325 TABLET ORAL at 21:46

## 2023-12-02 RX ADMIN — METOPROLOL TARTRATE 50 MG: 50 TABLET, FILM COATED ORAL at 21:44

## 2023-12-02 RX ADMIN — HYDRALAZINE HYDROCHLORIDE 5 MG: 20 INJECTION, SOLUTION INTRAMUSCULAR; INTRAVENOUS at 10:24

## 2023-12-02 RX ADMIN — FENTANYL CITRATE 25 MCG: 50 INJECTION, SOLUTION INTRAMUSCULAR; INTRAVENOUS at 05:42

## 2023-12-02 RX ADMIN — Medication 5 MG: at 09:16

## 2023-12-02 RX ADMIN — SODIUM CHLORIDE, PRESERVATIVE FREE 10 ML: 5 INJECTION INTRAVENOUS at 21:00

## 2023-12-02 RX ADMIN — SODIUM CHLORIDE: 9 INJECTION, SOLUTION INTRAVENOUS at 09:22

## 2023-12-02 RX ADMIN — OXYCODONE AND ACETAMINOPHEN 1 TABLET: 5; 325 TABLET ORAL at 04:18

## 2023-12-02 RX ADMIN — LISINOPRIL 10 MG: 20 TABLET ORAL at 09:14

## 2023-12-02 RX ADMIN — METOPROLOL TARTRATE 25 MG: 25 TABLET, FILM COATED ORAL at 08:16

## 2023-12-02 RX ADMIN — METOPROLOL TARTRATE 25 MG: 25 TABLET, FILM COATED ORAL at 13:20

## 2023-12-02 RX ADMIN — LEVETIRACETAM 500 MG: 500 INJECTION, SOLUTION, CONCENTRATE INTRAVENOUS at 08:16

## 2023-12-02 ASSESSMENT — PAIN SCALES - GENERAL
PAINLEVEL_OUTOF10: 6
PAINLEVEL_OUTOF10: 0
PAINLEVEL_OUTOF10: 10
PAINLEVEL_OUTOF10: 10
PAINLEVEL_OUTOF10: 0
PAINLEVEL_OUTOF10: 0
PAINLEVEL_OUTOF10: 3

## 2023-12-02 ASSESSMENT — ENCOUNTER SYMPTOMS
DIARRHEA: 0
ABDOMINAL DISTENTION: 0
WHEEZING: 0
ABDOMINAL PAIN: 0
CHEST TIGHTNESS: 0
SHORTNESS OF BREATH: 0
NAUSEA: 0
VOMITING: 0
COUGH: 0

## 2023-12-02 ASSESSMENT — PAIN DESCRIPTION - LOCATION
LOCATION: HEAD
LOCATION: LEG
LOCATION: HEAD
LOCATION: HEAD;NECK

## 2023-12-02 ASSESSMENT — PAIN - FUNCTIONAL ASSESSMENT: PAIN_FUNCTIONAL_ASSESSMENT: PREVENTS OR INTERFERES SOME ACTIVE ACTIVITIES AND ADLS

## 2023-12-02 ASSESSMENT — PAIN DESCRIPTION - ORIENTATION: ORIENTATION: ANTERIOR;POSTERIOR

## 2023-12-02 ASSESSMENT — PAIN DESCRIPTION - DESCRIPTORS: DESCRIPTORS: ACHING;DULL;DISCOMFORT

## 2023-12-02 NOTE — H&P
[address] [place] [address] Neurology   [address] [place] [address]    HISTORY AND PHYSICAL EXAMINATION            Date:   12/2/2023  Patient name:  Vic Jean  Date of admission:  11/29/2023  5:22 PM  MRN:   [id number]  Account:  [de-identified]  YOB: 1950  PCP:    No primary care provider on file. Room:   [address] [place] [address]  Code Status:    Full Code    Chief Complaint:     Chief Complaint   Patient presents with    Fatigue       History Obtained From:     patient    History of Present Illness: The patient is a 74 y.o. Non- / non  female with past medical history of hypertension, smoking who presents with Fatigue  And left homonymous hemianopsia and she is admitted to the hospital for the management of right parieto-occipital IPH. Head and neck patient reports for the past 3 days she has been unsteady on her feet, denies any falls. Family reportedly staring spells. Patient initially presented to [facility]. CT head showed right parietal lobe hemorrhage with extension into the occipital horn of the right lateral ventricle. CTA head and neck was unremarkable. Initial SBP was 170s and patient was started on Cardene infusion. Patient states she takes multiple aspirins many times a day to hip pain. Patient reports that has been smoking since age of 10 and not interested in smoking cessation, she drinks 1 beer per day and occasional mixed drink. Patient was transferred to [address] [place] [address] neuro ICU for closer monitoring. MRI brain with and without contrast showed no underlying mass. CT chest showed lobulated left upper and lower lobe mass concerning for malignancy, likely lung adenocarcinoma. IR was consulted and are for potential lung biopsy. I reports that optica be done outpatient. Patient also been found to have new onset atrial fibrillation with RVR. Was started on Cardizem drip. Cardiology was consulted and metoprolol was increased to 25 mg twice daily.   She

## 2023-12-02 NOTE — PLAN OF CARE
Problem: Discharge Planning  Goal: Discharge to home or other facility with appropriate resources  12/2/2023 0435 by Socorro Corrales RN  Outcome: Progressing     Problem: Skin/Tissue Integrity  Goal: Absence of new skin breakdown  Description: 1. Monitor for areas of redness and/or skin breakdown  2. Assess vascular access sites hourly  3. Every 4-6 hours minimum:  Change oxygen saturation probe site  4. Every 4-6 hours:  If on nasal continuous positive airway pressure, respiratory therapy assess nares and determine need for appliance change or resting period.   12/2/2023 0435 by Socorro Corrales RN  Outcome: Progressing     Problem: Safety - Adult  Goal: Free from fall injury  12/2/2023 0435 by Socorro Corrales RN  Outcome: Progressing     Problem: ABCDS Injury Assessment  Goal: Absence of physical injury  12/2/2023 0435 by Socorro Corrales RN  Outcome: Progressing     Problem: Pain  Goal: Verbalizes/displays adequate comfort level or baseline comfort level  12/2/2023 0435 by Socorro Corrales RN  Outcome: Progressing

## 2023-12-02 NOTE — CONSULTS
Consult received for lung nodule biopsy. CT from 11/29/23 demonstrates \"lobulated solid nodules of the left upper and left lower lobes measuring up to 1.9 cm\". It also appears that there is focal asymmetric thickening of the upper inner quadrant of the right breast on CT. Non-emergent cases like lung biopsies are not performed over the weekend as IR is not in-house and the team needs to be ready for emergent body and neurointerventional cases; typically biopsies are performed on an outpatient basis. The number for IR scheduling is [phone]. Also recommend correlation of the right breast findings with physical exam and diagnostic mammography. I do not see a mammogram or breast ultrasound in PACS.
[facility]    Department of Neurosurgery  Resident Consult Note      Reason for Consult:  Acute intraparenchymal hemorrhage in the right parietal lobe measuring 3.5 x 1.5 cm with extension into the occipital horn of the right lateral ventricle. Requesting Physician:    Neurosurgeon:   []Dr. Lyn Hawkins  []Dr. Ioana Minor  [x]Dr. Chris Pillai  []Dr. Daniel Aparicio  []Dr. Michelle Castro  []Dr. Ze Tellez    History Obtained From:  patient    CHIEF COMPLAINT:         Acute intraparenchymal hemorrhage in the right parietal lobe measuring 3.5 x 1.5 cm with extension into the occipital horn of the right lateral ventricle. HISTORY OF PRESENT ILLNESS:       The patient is a 74 y.o. female with history of HTN, afib not on AC, and smoking presented with right occipital intraparenchymal hemorrhage. Patient reports for the past 3 days she has been unsteady on her feet, denies any fall. Family also reports intermittent brief staring spells. Presented to [facility] today for evaluation. CT head showed right parietal lobe with extension into the occipital horn of the right lateral ventricle. CTA head/neck unremarkable. Initial 's on Cardene infusion. Per patient, she takes aspirin multiple times per day due to hip pain. Of note, patient reports she drinks 1 beer per day and the occasional mixed drink. Smokes heavily since age 10, not interested in smoking cessation. ICH score: 0     Admitted to ICU From: ER   Reason for ICU Admission: [facility]    PAST MEDICAL HISTORY :       Past Medical History:        Diagnosis Date    Hypertension        Past Surgical History:        Procedure Laterality Date    FEMUR FRACTURE SURGERY Left 6/23/2022    HIP TFN performed by Alix Dickey MD at [address] History:   Social History     Socioeconomic History    Marital status:       Spouse name: Not on file    Number of children: Not on file    Years of education: Not on file    Highest education level: Not on file
PULMONARY & CRITICAL CARE MEDICINE CONSULT NOTE     Patient:  Anya Delgado  MRN: [id number]  Admit date: 11/29/2023  Primary Care Physician: No primary care provider on file. Consulting Physician: Estrada Hughes MD  CODE Status: Full Code  LOS: 1     SUBJECTIVE     CHIEF COMPLAINT/REASON FOR CONSULT: Pulmonary nodules on CT chest.  HISTORY OF PRESENT ILLNESS:  The patient is a 74 y.o. female with PMH significant of A-fib (not on AC), HTN, extensive smoking history, presented with intermittent brief staring spells and sensation off and unsteadiness. CT head was obtained which showed right parieto-occipital intraparenchymal hemorrhage. CTA head/neck was unremarkable. She was hypertensive on presentation, was started on Cardene infusion. MRI brain was obtained with and without contrast which showed no underlying mass. However CT chest showed pulmonary nodules. She has significant past medical history of heavy smoking for last 60 years. On my evaluation, patient reports that she does not take any medications at home and does not have any medical conditions. She has been smoking for last 60 years, she started when she was 10years old. She was never diagnosed with COPD. Has some breathing difficulty on exertion but did not seek treatment. She reports a lot of fatigue. She is not sure if she lost any weight. CT chest with contrast showed lobulated solid nodules of left upper and left lower lobe measuring up to 1.9 cm. PAST MEDICAL HISTORY:        Diagnosis Date    Hypertension      PAST SURGICAL HISTORY:        Procedure Laterality Date    FEMUR FRACTURE SURGERY Left 6/23/2022    HIP TFN performed by Adry Flowers MD at [address] [place] [address]:   No family history on file. SOCIAL HISTORY:   TOBACCO:   reports that she has been smoking cigarettes. She has never used smokeless tobacco.  ETOH:  reports no history of alcohol use. DRUGS: reports no history of drug use.     ALLERGIES:    Allergies   Allergen
PERRL, no cervical lymphadenopathy. No masses palpable. Normal oral mucosa  Respiratory:  Normal excursion and expansion without use of accessory muscles  Resp Auscultation: Good respiratory effort. No for increased work of breathing. On auscultation: clear to auscultation bilaterally  Cardiovascular: The apical impulse is not displaced  Heart tones are crisp and normal. regular S1 and S2.  Jugular venous pulsation Normal  The carotid upstroke is normal in amplitude and contour without delay or bruit  Peripheral pulses are symmetrical and full   Abdomen:   No masses or tenderness  Bowel sounds present  Extremities:   No Cyanosis or Clubbing   Lower extremity edema: No   Skin: Warm and dry  Neurological:  Alert and oriented. Moves all extremities well  No abnormalities of mood, affect, memory, mentation, or behavior are noted        EKG:    Date: 12/01/23  Reading: No acute ischemia      LAST ECHO:  Date:  Findings Summary:      LAST Stress Test:   Date of last ST:  Major Findings:    LAST Cardiac Angiography:.  Date:  Findings:      Labs:     CBC:   Recent Labs     11/30/23 0317 12/01/23  0444   WBC 8.7 7.0   HGB 13.9 13.3   HCT 41.2 42.4    144     BMP:   Recent Labs     11/30/23 0317 12/01/23  0444    139   K 3.6* 4.1   CO2 23 20   BUN 10 14   CREATININE 0.4* 0.4*   LABGLOM >60 >60   GLUCOSE 83 76     BNP: No results for input(s): \"BNP\" in the last 72 hours. PT/INR:   Recent Labs     11/29/23  1520   PROTIME 13.3   INR 1.0     APTT:No results for input(s): \"APTT\" in the last 72 hours. CARDIAC ENZYMES:No results for input(s): \"CKTOTAL\", \"CKMB\", \"CKMBINDEX\", \"TROPONINI\" in the last 72 hours. FASTING LIPID PANEL:  Lab Results   Component Value Date/Time    HDL 46 11/30/2023 03:17 AM    TRIG 81 11/30/2023 03:17 AM     LIVER PROFILE:  Recent Labs     11/29/23  1520   AST 16   ALT 11   LABALBU 4.3     Troponins: Invalid input(s):  \"TROPONIN\"     Other Current Problems  Patient Active Problem List
\"PROTIME\", \"INR\" in the last 72 hours. IMAGING DATA:      Primary Problem  Intraparenchymal hemorrhage of brain [facility])    Active Hospital Problems    Diagnosis Date Noted    Pre-op evaluation [R71.088] 12/01/2023     Priority: High    Pediculosis capitis [B85.0] 12/01/2023    Primary hypertension [I10] 12/01/2023    Smoking [F17.200] 12/01/2023    Atrial fibrillation, chronic (HCC) [I48.20] 12/01/2023    Intraventricular hemorrhage ([address]) [I61.5] 11/30/2023    Intraparenchymal hemorrhage of brain ([address]) [I61.9] 11/29/2023         IMPRESSION:   Right parieto-occipital hemorrhage. No clear evidence of underlying mass  Hypertension  Lobulated lung mass, concern for malignancy    RECOMMENDATIONS:  Continue supportive care per neurology and critical care  Interventional radiology input appreciated. No plans for inpatient workup and the lobulated lung mass will be biopsied as an outpatient  Agree with pulmonary input, full cancer screening will be done as an outpatient  Smoking cessation  We will follow-up while inpatient and coordinate outpatient care. Patient will definitely need a biopsy of the lung mass. She will need a full cancer screening including mammogram and stool for occult blood      Discussed with patient and Nurse. Thank you for asking us to see this patient.     Maki Chawla MD  Hematologist/Medical Oncologist  Cell: [phone]
CTA HEAD NECK W CONTRAST    Result Date: 11/29/2023  EXAMINATION: CTA OF THE HEAD AND NECK WITH CONTRAST; CT OF THE HEAD WITHOUT CONTRAST 11/29/2023 4:18 pm: TECHNIQUE: CTA of the head and neck was performed with the administration of intravenous contrast. Multiplanar reformatted images are provided for review. MIP images are provided for review. Stenosis of the internal carotid arteries measured using NASCET criteria. Automated exposure control, iterative reconstruction, and/or weight based adjustment of the mA/kV was utilized to reduce the radiation dose to as low as reasonably achievable.; CT of the head was performed without the administration of intravenous contrast. Automated exposure control, iterative reconstruction, and/or weight based adjustment of the mA/kV was utilized to reduce the radiation dose to as low as reasonably achievable. Noncontrast CT of the head with reconstructed 2-D images are also provided for review. COMPARISON: None. HISTORY: Reason for Exam: gait off per family FINDINGS: CT HEAD: BRAIN/VENTRICLES:  There is a 3.5 x 1.5 cm acute intraparenchymal hemorrhage in the right parietal lobe with extension into the occipital horn of the right lateral ventricle. There is mild chronic small vessel ischemic disease and mild generalized atrophy. Grey-white differentiation is maintained. No evidence of significant mass effect or midline shift. No evidence of hydrocephalus. ORBITS: The visualized portion of the orbits demonstrate no acute abnormality. SINUSES:  The visualized paranasal sinuses and mastoid air cells demonstrate no acute abnormality. SOFT TISSUES/SKULL: No acute abnormality of the visualized skull or soft tissues. CTA NECK: AORTIC ARCH/ARCH VESSELS: No dissection or arterial injury. No significant stenosis of the brachiocephalic or subclavian arteries. CAROTID ARTERIES: No dissection, arterial injury, or hemodynamically significant stenosis by NASCET criteria.  VERTEBRAL

## 2023-12-03 ENCOUNTER — APPOINTMENT (OUTPATIENT)
Dept: CT IMAGING | Age: 73
DRG: 064 | End: 2023-12-03
Payer: MEDICARE

## 2023-12-03 LAB
ANION GAP SERPL CALCULATED.3IONS-SCNC: 11 MMOL/L (ref 9–17)
BUN SERPL-MCNC: 11 MG/DL (ref 8–23)
CALCIUM SERPL-MCNC: 8.5 MG/DL (ref 8.6–10.4)
CHLORIDE SERPL-SCNC: 104 MMOL/L (ref 98–107)
CO2 SERPL-SCNC: 22 MMOL/L (ref 20–31)
CREAT SERPL-MCNC: 0.3 MG/DL (ref 0.5–0.9)
ERYTHROCYTE [DISTWIDTH] IN BLOOD BY AUTOMATED COUNT: 13.1 % (ref 11.8–14.4)
GFR SERPL CREATININE-BSD FRML MDRD: >60 ML/MIN/1.73M2
GLUCOSE SERPL-MCNC: 90 MG/DL (ref 70–99)
HCT VFR BLD AUTO: 42 % (ref 36.3–47.1)
HGB BLD-MCNC: 13.4 G/DL (ref 11.9–15.1)
MCH RBC QN AUTO: 33.2 PG (ref 25.2–33.5)
MCHC RBC AUTO-ENTMCNC: 31.9 G/DL (ref 28.4–34.8)
MCV RBC AUTO: 104 FL (ref 82.6–102.9)
NRBC BLD-RTO: 0 PER 100 WBC
PLATELET # BLD AUTO: ABNORMAL K/UL (ref 138–453)
PLATELET, FLUORESCENCE: 145 K/UL (ref 138–453)
PLATELETS.RETICULATED NFR BLD AUTO: 9.7 % (ref 1.1–10.3)
POTASSIUM SERPL-SCNC: 3.9 MMOL/L (ref 3.7–5.3)
RBC # BLD AUTO: 4.04 M/UL (ref 3.95–5.11)
SODIUM SERPL-SCNC: 137 MMOL/L (ref 135–144)
WBC OTHER # BLD: 7.7 K/UL (ref 3.5–11.3)

## 2023-12-03 PROCEDURE — 99231 SBSQ HOSP IP/OBS SF/LOW 25: CPT | Performed by: INTERNAL MEDICINE

## 2023-12-03 PROCEDURE — 6360000002 HC RX W HCPCS: Performed by: EMERGENCY MEDICINE

## 2023-12-03 PROCEDURE — 6370000000 HC RX 637 (ALT 250 FOR IP)

## 2023-12-03 PROCEDURE — 6360000002 HC RX W HCPCS

## 2023-12-03 PROCEDURE — 6370000000 HC RX 637 (ALT 250 FOR IP): Performed by: PSYCHIATRY & NEUROLOGY

## 2023-12-03 PROCEDURE — 80048 BASIC METABOLIC PNL TOTAL CA: CPT

## 2023-12-03 PROCEDURE — 2580000003 HC RX 258: Performed by: EMERGENCY MEDICINE

## 2023-12-03 PROCEDURE — 36415 COLL VENOUS BLD VENIPUNCTURE: CPT

## 2023-12-03 PROCEDURE — 85027 COMPLETE CBC AUTOMATED: CPT

## 2023-12-03 PROCEDURE — 6360000002 HC RX W HCPCS: Performed by: STUDENT IN AN ORGANIZED HEALTH CARE EDUCATION/TRAINING PROGRAM

## 2023-12-03 PROCEDURE — 85055 RETICULATED PLATELET ASSAY: CPT

## 2023-12-03 PROCEDURE — 6370000000 HC RX 637 (ALT 250 FOR IP): Performed by: STUDENT IN AN ORGANIZED HEALTH CARE EDUCATION/TRAINING PROGRAM

## 2023-12-03 PROCEDURE — 2000000000 HC ICU R&B

## 2023-12-03 PROCEDURE — 2060000000 HC ICU INTERMEDIATE R&B

## 2023-12-03 PROCEDURE — 6370000000 HC RX 637 (ALT 250 FOR IP): Performed by: EMERGENCY MEDICINE

## 2023-12-03 PROCEDURE — 99232 SBSQ HOSP IP/OBS MODERATE 35: CPT | Performed by: PSYCHIATRY & NEUROLOGY

## 2023-12-03 PROCEDURE — 70450 CT HEAD/BRAIN W/O DYE: CPT

## 2023-12-03 RX ORDER — HYDRALAZINE HYDROCHLORIDE 20 MG/ML
5 INJECTION INTRAMUSCULAR; INTRAVENOUS EVERY 6 HOURS PRN
Status: DISCONTINUED | OUTPATIENT
Start: 2023-12-03 | End: 2023-12-04 | Stop reason: HOSPADM

## 2023-12-03 RX ORDER — LISINOPRIL 20 MG/1
20 TABLET ORAL DAILY
Status: DISCONTINUED | OUTPATIENT
Start: 2023-12-04 | End: 2023-12-04 | Stop reason: HOSPADM

## 2023-12-03 RX ORDER — AMLODIPINE BESYLATE 5 MG/1
2.5 TABLET ORAL DAILY
Status: DISCONTINUED | OUTPATIENT
Start: 2023-12-03 | End: 2023-12-04 | Stop reason: HOSPADM

## 2023-12-03 RX ORDER — HYDRALAZINE HYDROCHLORIDE 20 MG/ML
5 INJECTION INTRAMUSCULAR; INTRAVENOUS EVERY 6 HOURS PRN
Status: DISCONTINUED | OUTPATIENT
Start: 2023-12-03 | End: 2023-12-03

## 2023-12-03 RX ADMIN — Medication 5 MG: at 17:15

## 2023-12-03 RX ADMIN — HYDRALAZINE HYDROCHLORIDE 5 MG: 20 INJECTION, SOLUTION INTRAMUSCULAR; INTRAVENOUS at 20:35

## 2023-12-03 RX ADMIN — LEVETIRACETAM 500 MG: 500 INJECTION, SOLUTION, CONCENTRATE INTRAVENOUS at 08:05

## 2023-12-03 RX ADMIN — LISINOPRIL 10 MG: 20 TABLET ORAL at 08:04

## 2023-12-03 RX ADMIN — ONDANSETRON 4 MG: 2 INJECTION INTRAMUSCULAR; INTRAVENOUS at 17:11

## 2023-12-03 RX ADMIN — AMLODIPINE BESYLATE 2.5 MG: 5 TABLET ORAL at 22:08

## 2023-12-03 RX ADMIN — SODIUM CHLORIDE, PRESERVATIVE FREE 10 ML: 5 INJECTION INTRAVENOUS at 08:06

## 2023-12-03 RX ADMIN — METOPROLOL TARTRATE 50 MG: 50 TABLET, FILM COATED ORAL at 08:04

## 2023-12-03 RX ADMIN — OXYCODONE AND ACETAMINOPHEN 1 TABLET: 5; 325 TABLET ORAL at 17:11

## 2023-12-03 RX ADMIN — LEVETIRACETAM 500 MG: 500 INJECTION, SOLUTION, CONCENTRATE INTRAVENOUS at 20:24

## 2023-12-03 RX ADMIN — OXYCODONE AND ACETAMINOPHEN 1 TABLET: 5; 325 TABLET ORAL at 08:04

## 2023-12-03 RX ADMIN — ACETAMINOPHEN 650 MG: 325 TABLET ORAL at 20:35

## 2023-12-03 RX ADMIN — SODIUM CHLORIDE, PRESERVATIVE FREE 10 ML: 5 INJECTION INTRAVENOUS at 20:24

## 2023-12-03 RX ADMIN — METOPROLOL TARTRATE 50 MG: 50 TABLET, FILM COATED ORAL at 20:23

## 2023-12-03 RX ADMIN — SODIUM CHLORIDE: 9 INJECTION, SOLUTION INTRAVENOUS at 00:35

## 2023-12-03 ASSESSMENT — PAIN DESCRIPTION - DESCRIPTORS: DESCRIPTORS: OTHER (COMMENT)

## 2023-12-03 ASSESSMENT — PAIN - FUNCTIONAL ASSESSMENT: PAIN_FUNCTIONAL_ASSESSMENT: PREVENTS OR INTERFERES SOME ACTIVE ACTIVITIES AND ADLS

## 2023-12-03 ASSESSMENT — PAIN SCALES - GENERAL
PAINLEVEL_OUTOF10: 10
PAINLEVEL_OUTOF10: 9
PAINLEVEL_OUTOF10: 0

## 2023-12-03 ASSESSMENT — PAIN DESCRIPTION - LOCATION
LOCATION: BACK
LOCATION: HEAD

## 2023-12-03 ASSESSMENT — PAIN DESCRIPTION - ONSET: ONSET: ON-GOING

## 2023-12-03 ASSESSMENT — PAIN DESCRIPTION - PAIN TYPE: TYPE: ACUTE PAIN

## 2023-12-03 ASSESSMENT — PAIN DESCRIPTION - FREQUENCY: FREQUENCY: CONTINUOUS

## 2023-12-03 ASSESSMENT — PAIN DESCRIPTION - ORIENTATION: ORIENTATION: POSTERIOR

## 2023-12-03 NOTE — CARE COORDINATION
Transitional planning: Patient wants to go home and adamant that she is not going to SNF. Patient went down for testing and spoke to daughter and wants patient to go home. May be agreeable to home care, but patient is currently not available. [address] list given to daughter to make choices.

## 2023-12-03 NOTE — FLOWSHEET NOTE
Patient lying on left side with arm tucked under head, retook several times, (was reading much higher).

## 2023-12-04 VITALS
WEIGHT: 90.39 LBS | DIASTOLIC BLOOD PRESSURE: 60 MMHG | OXYGEN SATURATION: 91 % | TEMPERATURE: 97.4 F | SYSTOLIC BLOOD PRESSURE: 101 MMHG | BODY MASS INDEX: 15.43 KG/M2 | HEART RATE: 76 BPM | RESPIRATION RATE: 14 BRPM | HEIGHT: 64 IN

## 2023-12-04 LAB
ANION GAP SERPL CALCULATED.3IONS-SCNC: 8 MMOL/L (ref 9–17)
BUN SERPL-MCNC: 8 MG/DL (ref 8–23)
CALCIUM SERPL-MCNC: 8.6 MG/DL (ref 8.6–10.4)
CHLORIDE SERPL-SCNC: 105 MMOL/L (ref 98–107)
CO2 SERPL-SCNC: 27 MMOL/L (ref 20–31)
CREAT SERPL-MCNC: 0.3 MG/DL (ref 0.5–0.9)
ERYTHROCYTE [DISTWIDTH] IN BLOOD BY AUTOMATED COUNT: 13.2 % (ref 11.8–14.4)
GFR SERPL CREATININE-BSD FRML MDRD: >60 ML/MIN/1.73M2
GLUCOSE SERPL-MCNC: 103 MG/DL (ref 70–99)
HCT VFR BLD AUTO: 38.9 % (ref 36.3–47.1)
HGB BLD-MCNC: 12.6 G/DL (ref 11.9–15.1)
MCH RBC QN AUTO: 33.3 PG (ref 25.2–33.5)
MCHC RBC AUTO-ENTMCNC: 32.4 G/DL (ref 28.4–34.8)
MCV RBC AUTO: 102.9 FL (ref 82.6–102.9)
NRBC BLD-RTO: 0 PER 100 WBC
PLATELET # BLD AUTO: ABNORMAL K/UL (ref 138–453)
PLATELET, FLUORESCENCE: 145 K/UL (ref 138–453)
PLATELETS.RETICULATED NFR BLD AUTO: 10.1 % (ref 1.1–10.3)
POTASSIUM SERPL-SCNC: 3.4 MMOL/L (ref 3.7–5.3)
RBC # BLD AUTO: 3.78 M/UL (ref 3.95–5.11)
SODIUM SERPL-SCNC: 140 MMOL/L (ref 135–144)
WBC OTHER # BLD: 5.8 K/UL (ref 3.5–11.3)

## 2023-12-04 PROCEDURE — 85055 RETICULATED PLATELET ASSAY: CPT

## 2023-12-04 PROCEDURE — 36415 COLL VENOUS BLD VENIPUNCTURE: CPT

## 2023-12-04 PROCEDURE — 2580000003 HC RX 258: Performed by: EMERGENCY MEDICINE

## 2023-12-04 PROCEDURE — 6370000000 HC RX 637 (ALT 250 FOR IP)

## 2023-12-04 PROCEDURE — 6360000002 HC RX W HCPCS: Performed by: EMERGENCY MEDICINE

## 2023-12-04 PROCEDURE — 99232 SBSQ HOSP IP/OBS MODERATE 35: CPT | Performed by: INTERNAL MEDICINE

## 2023-12-04 PROCEDURE — 85027 COMPLETE CBC AUTOMATED: CPT

## 2023-12-04 PROCEDURE — 6360000002 HC RX W HCPCS: Performed by: NURSE PRACTITIONER

## 2023-12-04 PROCEDURE — 94761 N-INVAS EAR/PLS OXIMETRY MLT: CPT

## 2023-12-04 PROCEDURE — 97535 SELF CARE MNGMENT TRAINING: CPT

## 2023-12-04 PROCEDURE — 6370000000 HC RX 637 (ALT 250 FOR IP): Performed by: NURSE PRACTITIONER

## 2023-12-04 PROCEDURE — 99239 HOSP IP/OBS DSCHRG MGMT >30: CPT | Performed by: STUDENT IN AN ORGANIZED HEALTH CARE EDUCATION/TRAINING PROGRAM

## 2023-12-04 PROCEDURE — 6360000002 HC RX W HCPCS

## 2023-12-04 PROCEDURE — 6370000000 HC RX 637 (ALT 250 FOR IP): Performed by: STUDENT IN AN ORGANIZED HEALTH CARE EDUCATION/TRAINING PROGRAM

## 2023-12-04 PROCEDURE — 97110 THERAPEUTIC EXERCISES: CPT

## 2023-12-04 PROCEDURE — 80048 BASIC METABOLIC PNL TOTAL CA: CPT

## 2023-12-04 PROCEDURE — 97129 THER IVNTJ 1ST 15 MIN: CPT

## 2023-12-04 PROCEDURE — 97530 THERAPEUTIC ACTIVITIES: CPT

## 2023-12-04 PROCEDURE — 99231 SBSQ HOSP IP/OBS SF/LOW 25: CPT | Performed by: PSYCHIATRY & NEUROLOGY

## 2023-12-04 RX ORDER — LISINOPRIL 20 MG/1
20 TABLET ORAL DAILY
Qty: 30 TABLET | Refills: 3 | Status: SHIPPED | OUTPATIENT
Start: 2023-12-05

## 2023-12-04 RX ORDER — AMLODIPINE BESYLATE 2.5 MG/1
2.5 TABLET ORAL DAILY
Qty: 30 TABLET | Refills: 3 | Status: SHIPPED | OUTPATIENT
Start: 2023-12-05

## 2023-12-04 RX ORDER — LEVETIRACETAM 500 MG/1
500 TABLET ORAL 2 TIMES DAILY
Status: DISCONTINUED | OUTPATIENT
Start: 2023-12-04 | End: 2023-12-04 | Stop reason: HOSPADM

## 2023-12-04 RX ORDER — METOPROLOL TARTRATE 50 MG/1
50 TABLET, FILM COATED ORAL 2 TIMES DAILY
Qty: 60 TABLET | Refills: 3 | Status: SHIPPED | OUTPATIENT
Start: 2023-12-04

## 2023-12-04 RX ORDER — LEVETIRACETAM 500 MG/1
500 TABLET ORAL 2 TIMES DAILY
Qty: 6 TABLET | Refills: 0 | Status: SHIPPED | OUTPATIENT
Start: 2023-12-04 | End: 2023-12-07

## 2023-12-04 RX ADMIN — METOPROLOL TARTRATE 50 MG: 50 TABLET, FILM COATED ORAL at 09:04

## 2023-12-04 RX ADMIN — ENOXAPARIN SODIUM 30 MG: 100 INJECTION SUBCUTANEOUS at 09:08

## 2023-12-04 RX ADMIN — SODIUM CHLORIDE, PRESERVATIVE FREE 10 ML: 5 INJECTION INTRAVENOUS at 09:06

## 2023-12-04 RX ADMIN — Medication 5 MG: at 07:31

## 2023-12-04 RX ADMIN — OXYCODONE AND ACETAMINOPHEN 1 TABLET: 5; 325 TABLET ORAL at 03:51

## 2023-12-04 RX ADMIN — AMLODIPINE BESYLATE 2.5 MG: 5 TABLET ORAL at 09:08

## 2023-12-04 RX ADMIN — LISINOPRIL 20 MG: 20 TABLET ORAL at 09:04

## 2023-12-04 RX ADMIN — LEVETIRACETAM 500 MG: 500 INJECTION, SOLUTION, CONCENTRATE INTRAVENOUS at 09:05

## 2023-12-04 RX ADMIN — POTASSIUM BICARBONATE 40 MEQ: 782 TABLET, EFFERVESCENT ORAL at 06:58

## 2023-12-04 RX ADMIN — OXYCODONE AND ACETAMINOPHEN 1 TABLET: 5; 325 TABLET ORAL at 10:59

## 2023-12-04 ASSESSMENT — PAIN DESCRIPTION - LOCATION
LOCATION: HEAD
LOCATION: WRIST

## 2023-12-04 ASSESSMENT — PAIN SCALES - GENERAL
PAINLEVEL_OUTOF10: 5
PAINLEVEL_OUTOF10: 9
PAINLEVEL_OUTOF10: 10

## 2023-12-04 NOTE — DISCHARGE SUMMARY
[address]     Department of Neurology    INPATIENT DISCHARGE SUMMARY        Patient Identification:  Amanda Flores is a 74 y.o. female. :  1950  MRN: [id number]     Acct: [de-identified]   Admit Date:  2023  Discharge date and time: 23 later afternoon   Attending Provider: Taylor Lawson MD                                     Admission Diagnoses:   Intraventricular hemorrhage ([address]) [I61.5]  Intraparenchymal hemorrhage of brain [facility]) [I61.9]    Discharge Diagnoses:   Principal Problem:    Intraparenchymal hemorrhage of brain ([address])  Active Problems:    Pre-op evaluation    Intraventricular hemorrhage ([address])    Pediculosis capitis    Primary hypertension    Smoking    Atrial fibrillation, chronic (HCC)    Lung mass  Resolved Problems:    * No resolved hospital problems. *       Consults:   cardiology, pulmonary/intensive care, ID, hematology/oncology, and NSG, IR     Brief Inpatient course: The patient is a 74 y.o. Non- / non  female with past medical history of hypertension, smoking who presents with Fatigue  And left homonymous hemianopsia and she is admitted to the hospital for the management of right parietal IPH with IVH. Head and neck patient reports for the past 3 days she has been unsteady on her feet, denies any falls. Family reportedly staring spells. Patient initially presented to [facility]. CT head showed right parietal lobe hemorrhage with extension into the occipital horn of the right lateral ventricle. CTA head and neck was unremarkable. Initial SBP was 170s and patient was started on Cardene infusion. Patient states she takes multiple aspirins many times a day to hip pain. Patient reports that has been smoking since age of 10 and not interested in smoking cessation, she drinks 1 beer per day and occasional mixed drink. Patient was transferred to [facility] neuro ICU for closer monitoring.   MRI brain with and without contrast

## 2023-12-04 NOTE — CARE COORDINATION
Transitional Planning  Spoke with patient, discussed [address] services. Patient currently is declining [address] and SNF. States that her dtr will assist  with care needs. Has transportation. Denies further needs.      Discharge [address] Case Management Department  Written by: Harpal Galvez RN    Patient Name: Lizbeth Shannon  Attending Provider: No att. providers found  Admit Date: 2023  5:22 PM  MRN: [id number]  Account: [de-identified]                     : 1950  Discharge Date: 2023      Disposition: home    Harpal Galvez RN

## 2023-12-04 NOTE — PLAN OF CARE
Problem: Discharge Planning  Goal: Discharge to home or other facility with appropriate resources  12/4/2023 0506 by Prosper Norman RN  Outcome: Progressing  Flowsheets (Taken 12/3/2023 2230)  Discharge to home or other facility with appropriate resources: Identify barriers to discharge with patient and caregiver     Problem: Skin/Tissue Integrity  Goal: Absence of new skin breakdown  Description: 1. Monitor for areas of redness and/or skin breakdown  2. Assess vascular access sites hourly  3. Every 4-6 hours minimum:  Change oxygen saturation probe site  4. Every 4-6 hours:  If on nasal continuous positive airway pressure, respiratory therapy assess nares and determine need for appliance change or resting period.   12/4/2023 0506 by Prosper Norman RN  Outcome: Progressing     Problem: Safety - Adult  Goal: Free from fall injury  12/4/2023 0506 by Prosper Norman RN  Outcome: Progressing

## 2023-12-04 NOTE — PLAN OF CARE
Problem: Discharge Planning  Goal: Discharge to home or other facility with appropriate resources  12/4/2023 1323 by Destiny Alvares RN  Outcome: Progressing  Flowsheets (Taken 12/4/2023 7900)  Discharge to home or other facility with appropriate resources:   Identify barriers to discharge with patient and caregiver   Identify discharge learning needs (meds, wound care, etc)   Arrange for needed discharge resources and transportation as appropriate   Arrange for interpreters to assist at discharge as needed   Refer to discharge planning if patient needs post-hospital services based on physician order or complex needs related to functional status, cognitive ability or social support system  12/4/2023 0506 by Marc Herman RN  Outcome: Progressing  Flowsheets (Taken 12/3/2023 2230)  Discharge to home or other facility with appropriate resources: Identify barriers to discharge with patient and caregiver     Problem: Skin/Tissue Integrity  Goal: Absence of new skin breakdown  Description: 1. Monitor for areas of redness and/or skin breakdown  2. Assess vascular access sites hourly  3. Every 4-6 hours minimum:  Change oxygen saturation probe site  4. Every 4-6 hours:  If on nasal continuous positive airway pressure, respiratory therapy assess nares and determine need for appliance change or resting period.   12/4/2023 1323 by Destiny Alvares RN  Outcome: Progressing  Note: No new skin breakdown noted  12/4/2023 0506 by Marc Herman RN  Outcome: Progressing     Problem: Safety - Adult  Goal: Free from fall injury  12/4/2023 1323 by Destiny Alvares RN  Outcome: Progressing  Note: Pt remains free from falls  12/4/2023 0506 by Marc Herman RN  Outcome: Progressing     Problem: Pain  Goal: Verbalizes/displays adequate comfort level or baseline comfort level  Outcome: Progressing

## 2023-12-05 PROBLEM — F43.21 ACUTE ADJUSTMENT DISORDER WITH DEPRESSED MOOD: Status: ACTIVE | Noted: 2023-12-05

## 2023-12-31 PROBLEM — Z01.818 PRE-OP EVALUATION: Status: RESOLVED | Noted: 2023-12-01 | Resolved: 2023-12-31

## 2024-03-28 NOTE — TELEPHONE ENCOUNTER
Pharmacy requesting refill of Amlodipine 2.5 mg, Metoprolol 50 mg, and Lisinopril 20 mg.      Medication active on med list yes      Date of last Rx: 12/5/23 with 3 refills            Date of last appointment None- was seen in the hospital 12/23    Next Visit Date:  4/25/2024    Pt is almost out of her medications

## 2024-03-31 RX ORDER — AMLODIPINE BESYLATE 2.5 MG/1
2.5 TABLET ORAL DAILY
Qty: 30 TABLET | Refills: 3 | Status: SHIPPED | OUTPATIENT
Start: 2024-03-31

## 2024-03-31 RX ORDER — METOPROLOL TARTRATE 50 MG/1
50 TABLET, FILM COATED ORAL 2 TIMES DAILY
Qty: 60 TABLET | Refills: 3 | Status: SHIPPED | OUTPATIENT
Start: 2024-03-31

## 2024-03-31 RX ORDER — LEVETIRACETAM 500 MG/1
500 TABLET ORAL 2 TIMES DAILY
Qty: 6 TABLET | Refills: 0 | Status: SHIPPED | OUTPATIENT
Start: 2024-03-31 | End: 2024-04-03

## 2024-03-31 RX ORDER — LISINOPRIL 20 MG/1
20 TABLET ORAL DAILY
Qty: 30 TABLET | Refills: 3 | Status: SHIPPED | OUTPATIENT
Start: 2024-03-31

## 2024-04-01 RX ORDER — AMLODIPINE BESYLATE 2.5 MG/1
2.5 TABLET ORAL DAILY
Qty: 90 TABLET | OUTPATIENT
Start: 2024-04-01

## 2024-04-01 RX ORDER — LISINOPRIL 20 MG/1
20 TABLET ORAL DAILY
Qty: 90 TABLET | OUTPATIENT
Start: 2024-04-01

## 2024-04-02 RX ORDER — LEVETIRACETAM 500 MG/1
TABLET ORAL
Qty: 60 TABLET | Refills: 3 | Status: SHIPPED | OUTPATIENT
Start: 2024-04-02

## 2024-04-02 NOTE — TELEPHONE ENCOUNTER
E-scribe requesting refill for Levetiracetam. Please review and e-scribe if applicable.     Last Visit Date: None, last filled 03/31/2024 #6/0 refills    Next Visit Date:  Future Appointments   Date Time Provider Department Center   4/25/2024  3:00 PM Kelly Love MD Neuro [facility] Neurology -

## 2024-04-04 RX ORDER — LEVETIRACETAM 500 MG/1
500 TABLET ORAL 2 TIMES DAILY
Qty: 180 TABLET | OUTPATIENT
Start: 2024-04-04

## 2024-10-01 ENCOUNTER — APPOINTMENT (OUTPATIENT)
Dept: GENERAL RADIOLOGY | Age: 74
End: 2024-10-01
Payer: MEDICARE

## 2024-10-01 ENCOUNTER — APPOINTMENT (OUTPATIENT)
Dept: CT IMAGING | Age: 74
End: 2024-10-01
Payer: MEDICARE

## 2024-10-01 ENCOUNTER — HOSPITAL ENCOUNTER (EMERGENCY)
Age: 74
Discharge: HOME | End: 2024-10-01
Attending: STUDENT IN AN ORGANIZED HEALTH CARE EDUCATION/TRAINING PROGRAM
Payer: MEDICARE

## 2024-10-01 VITALS
SYSTOLIC BLOOD PRESSURE: 187 MMHG | HEART RATE: 87 BPM | RESPIRATION RATE: 25 BRPM | TEMPERATURE: 98.2 F | BODY MASS INDEX: 15.36 KG/M2 | WEIGHT: 90 LBS | OXYGEN SATURATION: 95 % | HEIGHT: 64 IN | DIASTOLIC BLOOD PRESSURE: 93 MMHG

## 2024-10-01 DIAGNOSIS — R53.1 GENERAL WEAKNESS: Primary | ICD-10-CM

## 2024-10-01 LAB
ALBUMIN SERPL-MCNC: 4 G/DL (ref 3.5–5.2)
ALP SERPL-CCNC: 76 U/L (ref 35–104)
ALT SERPL-CCNC: 14 U/L (ref 5–33)
AMMONIA PLAS-SCNC: 30 UMOL/L (ref 11–51)
ANION GAP SERPL CALCULATED.3IONS-SCNC: 9 MMOL/L (ref 9–17)
AST SERPL-CCNC: 16 U/L
BACTERIA URNS QL MICRO: ABNORMAL
BASOPHILS # BLD: 0.1 K/UL (ref 0–0.2)
BASOPHILS NFR BLD: 1 % (ref 0–2)
BILIRUB SERPL-MCNC: 1.3 MG/DL (ref 0.3–1.2)
BILIRUB UR QL STRIP: NEGATIVE
BUN SERPL-MCNC: 20 MG/DL (ref 8–23)
CALCIUM SERPL-MCNC: 8.7 MG/DL (ref 8.6–10.4)
CHLORIDE SERPL-SCNC: 107 MMOL/L (ref 98–107)
CLARITY UR: ABNORMAL
CO2 SERPL-SCNC: 29 MMOL/L (ref 20–31)
COLOR UR: YELLOW
CREAT SERPL-MCNC: 0.8 MG/DL (ref 0.5–0.9)
EOSINOPHIL # BLD: 0.1 K/UL (ref 0–0.4)
EOSINOPHILS RELATIVE PERCENT: 2 % (ref 0–4)
EPI CELLS #/AREA URNS HPF: ABNORMAL /HPF
ERYTHROCYTE [DISTWIDTH] IN BLOOD BY AUTOMATED COUNT: 14.3 % (ref 11.5–14.9)
GFR, ESTIMATED: 76 ML/MIN/1.73M2
GLUCOSE SERPL-MCNC: 100 MG/DL (ref 70–99)
GLUCOSE UR STRIP-MCNC: NEGATIVE MG/DL
HCT VFR BLD AUTO: 43.7 % (ref 36–46)
HGB BLD-MCNC: 14.3 G/DL (ref 12–16)
HGB UR QL STRIP.AUTO: ABNORMAL
KETONES UR STRIP-MCNC: ABNORMAL MG/DL
LEUKOCYTE ESTERASE UR QL STRIP: ABNORMAL
LYMPHOCYTES NFR BLD: 0.8 K/UL (ref 1–4.8)
LYMPHOCYTES RELATIVE PERCENT: 16 % (ref 24–44)
MAGNESIUM SERPL-MCNC: 2 MG/DL (ref 1.6–2.6)
MCH RBC QN AUTO: 33.2 PG (ref 26–34)
MCHC RBC AUTO-ENTMCNC: 32.7 G/DL (ref 31–37)
MCV RBC AUTO: 101.4 FL (ref 80–100)
MONOCYTES NFR BLD: 0.3 K/UL (ref 0.1–1.3)
MONOCYTES NFR BLD: 7 % (ref 1–7)
NEUTROPHILS NFR BLD: 74 % (ref 36–66)
NEUTS SEG NFR BLD: 3.9 K/UL (ref 1.3–9.1)
NITRITE UR QL STRIP: POSITIVE
PH UR STRIP: 5.5 [PH] (ref 5–8)
PLATELET # BLD AUTO: 132 K/UL (ref 150–450)
PMV BLD AUTO: 9.7 FL (ref 6–12)
POTASSIUM SERPL-SCNC: 4 MMOL/L (ref 3.7–5.3)
PROT SERPL-MCNC: 6.4 G/DL (ref 6.4–8.3)
PROT UR STRIP-MCNC: ABNORMAL MG/DL
RBC # BLD AUTO: 4.31 M/UL (ref 4–5.2)
RBC #/AREA URNS HPF: ABNORMAL /HPF
SODIUM SERPL-SCNC: 145 MMOL/L (ref 135–144)
SP GR UR STRIP: 1.02 (ref 1–1.03)
TRICHOMONAS #/AREA URNS HPF: ABNORMAL /[HPF]
TROPONIN I SERPL HS-MCNC: 36 NG/L (ref 0–14)
TROPONIN I SERPL HS-MCNC: 42 NG/L (ref 0–14)
TSH SERPL DL<=0.05 MIU/L-ACNC: 0.73 UIU/ML (ref 0.3–5)
UROBILINOGEN UR STRIP-ACNC: NORMAL EU/DL (ref 0–1)
WBC #/AREA URNS HPF: ABNORMAL /HPF
WBC OTHER # BLD: 5.2 K/UL (ref 3.5–11)

## 2024-10-01 PROCEDURE — 82140 ASSAY OF AMMONIA: CPT

## 2024-10-01 PROCEDURE — 85025 COMPLETE CBC W/AUTO DIFF WBC: CPT

## 2024-10-01 PROCEDURE — 87086 URINE CULTURE/COLONY COUNT: CPT

## 2024-10-01 PROCEDURE — 87186 SC STD MICRODIL/AGAR DIL: CPT

## 2024-10-01 PROCEDURE — 80053 COMPREHEN METABOLIC PANEL: CPT

## 2024-10-01 PROCEDURE — 87077 CULTURE AEROBIC IDENTIFY: CPT

## 2024-10-01 PROCEDURE — 6370000000 HC RX 637 (ALT 250 FOR IP)

## 2024-10-01 PROCEDURE — 70450 CT HEAD/BRAIN W/O DYE: CPT

## 2024-10-01 PROCEDURE — 99285 EMERGENCY DEPT VISIT HI MDM: CPT

## 2024-10-01 PROCEDURE — 83735 ASSAY OF MAGNESIUM: CPT

## 2024-10-01 PROCEDURE — 36415 COLL VENOUS BLD VENIPUNCTURE: CPT

## 2024-10-01 PROCEDURE — 81001 URINALYSIS AUTO W/SCOPE: CPT

## 2024-10-01 PROCEDURE — 71046 X-RAY EXAM CHEST 2 VIEWS: CPT

## 2024-10-01 PROCEDURE — 84443 ASSAY THYROID STIM HORMONE: CPT

## 2024-10-01 PROCEDURE — 84484 ASSAY OF TROPONIN QUANT: CPT

## 2024-10-01 RX ORDER — CEFDINIR 300 MG/1
300 CAPSULE ORAL 2 TIMES DAILY
Qty: 10 CAPSULE | Refills: 0 | Status: SHIPPED | OUTPATIENT
Start: 2024-10-01 | End: 2024-10-06

## 2024-10-01 RX ORDER — CEFDINIR 300 MG/1
300 CAPSULE ORAL ONCE
Status: COMPLETED | OUTPATIENT
Start: 2024-10-01 | End: 2024-10-01

## 2024-10-01 RX ADMIN — CEFDINIR 300 MG: 300 CAPSULE ORAL at 21:14

## 2024-10-01 ASSESSMENT — LIFESTYLE VARIABLES
HOW MANY STANDARD DRINKS CONTAINING ALCOHOL DO YOU HAVE ON A TYPICAL DAY: 3 OR 4
HOW OFTEN DO YOU HAVE A DRINK CONTAINING ALCOHOL: 4 OR MORE TIMES A WEEK

## 2024-10-02 NOTE — DISCHARGE INSTRUCTIONS
You are seen here for weakness.    We evaluated you, and found that your cardiac enzymes were slightly elevated.  Recommended that you see cardiology, however you are standing the 1 to be discharged at this time.  We are giving you the number for cardiology.  They will call you and make an appointment.    Please follow-up with your primary care provider.  If you do not have a primary care provider, we are giving you the number for the [facility].  Please call and make an appoint with them.    You may return to the emergency department with any new or worsening symptoms.    Please take your antibiotics as prescribed.  Please take the entire course.  Please not take these with alcohol, as they will decrease the effectiveness of the antibiotics

## 2024-10-05 LAB
EKG ATRIAL RATE: 95 BPM
EKG P AXIS: 59 DEGREES
EKG P-R INTERVAL: 172 MS
EKG Q-T INTERVAL: 402 MS
EKG QRS DURATION: 70 MS
EKG QTC CALCULATION (BAZETT): 505 MS
EKG R AXIS: -55 DEGREES
EKG T AXIS: -6 DEGREES
EKG VENTRICULAR RATE: 95 BPM
MICROORGANISM SPEC CULT: ABNORMAL
MICROORGANISM SPEC CULT: ABNORMAL
SPECIMEN DESCRIPTION: ABNORMAL

## 2025-09-04 ENCOUNTER — HOSPITAL ENCOUNTER (EMERGENCY)
Age: 75
Discharge: HOME | End: 2025-09-04
Attending: STUDENT IN AN ORGANIZED HEALTH CARE EDUCATION/TRAINING PROGRAM

## 2025-09-04 VITALS
HEART RATE: 62 BPM | OXYGEN SATURATION: 97 % | DIASTOLIC BLOOD PRESSURE: 92 MMHG | RESPIRATION RATE: 18 BRPM | SYSTOLIC BLOOD PRESSURE: 169 MMHG | TEMPERATURE: 97.7 F

## 2025-09-04 DIAGNOSIS — S05.02XA ABRASION OF LEFT CORNEA, INITIAL ENCOUNTER: Primary | ICD-10-CM

## 2025-09-04 RX ORDER — KETOROLAC TROMETHAMINE 5 MG/ML
1 SOLUTION OPHTHALMIC 4 TIMES DAILY
Qty: 3 ML | Refills: 0 | Status: SHIPPED | OUTPATIENT
Start: 2025-09-04 | End: 2025-09-19

## 2025-09-04 RX ORDER — CIPROFLOXACIN HYDROCHLORIDE 3.5 MG/ML
1 SOLUTION/ DROPS TOPICAL 4 TIMES DAILY
Qty: 2 ML | Refills: 0 | Status: SHIPPED | OUTPATIENT
Start: 2025-09-04 | End: 2025-09-14

## 2025-09-05 ASSESSMENT — ENCOUNTER SYMPTOMS
EYE REDNESS: 1
EYE PAIN: 1

## (undated) DEVICE — DRESSING POSTOP AG PRISMASEAL 3.5X6IN

## (undated) DEVICE — SOLUTION IRRIG 1000ML STRL H2O USP PLAS POUR BTL

## (undated) DEVICE — BIT DRL L330MM DIA4.2MM CALIB 100MM 3 FLUT QUIK CPL

## (undated) DEVICE — NEEDLE SPNL 18GA L3.5IN W/ QNCKE SHARPER BVL DURA CLICK

## (undated) DEVICE — 7.5MM ACORN

## (undated) DEVICE — SVMMC CRANI PK

## (undated) DEVICE — COVER US PRB W15XL120CM W/ GEL RUBBERBAND TAPE STRP FLD GEN

## (undated) DEVICE — SUTURE D SPEC VCRL 2 0 D8876

## (undated) DEVICE — SUTURE VIC + LEN CP1 0 70CM VCP267H

## (undated) DEVICE — DRAPE,REIN 53X77,STERILE: Brand: MEDLINE

## (undated) DEVICE — STRAP,POSITIONING,KNEE/BODY,FOAM,4X60": Brand: MEDLINE

## (undated) DEVICE — PAD,NON-ADHERENT,3X8,STERILE,LF,1/PK: Brand: MEDLINE

## (undated) DEVICE — DRAPE MICSCP W117XL305CM DIA65MM LENS W VARI LENS2 FOR LEICA

## (undated) DEVICE — Device

## (undated) DEVICE — COVER,MAYO STAND,XL,STERILE: Brand: MEDLINE

## (undated) DEVICE — AGENT HEMSTAT W2XL4IN OXIDIZED REGENERATED CELOS ABSRB

## (undated) DEVICE — TUBING, SUCTION, 3/16" X 10', STRAIGHT: Brand: MEDLINE

## (undated) DEVICE — 1LYRTR 16FR10ML100%SIL UMS SNP: Brand: MEDLINE INDUSTRIES, INC.

## (undated) DEVICE — CONTAINER,SPECIMEN,4OZ,OR STRL: Brand: MEDLINE

## (undated) DEVICE — GLOVE SURG SZ 7 L12IN THK7.5MIL DK GRN LTX FREE MSG6570] MEDLINE INDUSTRIES INC]

## (undated) DEVICE — BLANKET WRM W29.9XL79.1IN UP BODY FORC AIR MISTRAL-AIR

## (undated) DEVICE — GOWN,AURORA,NONREINFORCED,LARGE: Brand: MEDLINE

## (undated) DEVICE — GLOVE ORTHO 8   MSG9480

## (undated) DEVICE — MARKER,SKIN,WI/RULER AND LABELS: Brand: MEDLINE

## (undated) DEVICE — AGENT HEMOSTATIC SURGIFLOW MATRIX KIT W/THROMBIN

## (undated) DEVICE — GUIDEWIRE ORTH L400MM DIA3.2MM FOR TFN

## (undated) DEVICE — SYRINGE 20ML LL S/C 50

## (undated) DEVICE — SOLUTION IRRIG 1000ML 0.9% SOD CHL USP POUR PLAS BTL

## (undated) DEVICE — LARGE BLUNT, DUAL PACK: Brand: LINA SKIN HOOK™

## (undated) DEVICE — SUTURE NRLN SZ 4-0 L18IN NONABSORBABLE BLK L13MM TF 1/2 CIR C584D

## (undated) DEVICE — 3M™ STERI-DRAPE™ U-DRAPE 1015: Brand: STERI-DRAPE™

## (undated) DEVICE — APPLICATOR MEDICATED 10.5 CC SOLUTION HI LT ORNG CHLORAPREP

## (undated) DEVICE — GLOVE SURG SZ 8 L12IN FNGR THK79MIL GRN LTX FREE

## (undated) DEVICE — GOWN,SIRUS,NONRNF,SETINSLV,XL,20/CS: Brand: MEDLINE

## (undated) DEVICE — GAUZE,SPONGE,FLUFF,6"X6.75",STRL,5/TRAY: Brand: MEDLINE

## (undated) DEVICE — 6619 2 PTNT ISO SYS INCISE AREA&LT;(&GT;&&LT;)&GT;P: Brand: STERI-DRAPE™ IOBAN™ 2

## (undated) DEVICE — 2.3MM TAPERED ROUTER

## (undated) DEVICE — GARMENT,MEDLINE,DVT,INT,CALF,MED, GEN2: Brand: MEDLINE

## (undated) DEVICE — SUTURE VCRL + SZ 2-0 L27IN ABSRB UD CP-1 1/2 CIR REV CUT VCP266H